# Patient Record
Sex: FEMALE | Race: WHITE | NOT HISPANIC OR LATINO
[De-identification: names, ages, dates, MRNs, and addresses within clinical notes are randomized per-mention and may not be internally consistent; named-entity substitution may affect disease eponyms.]

---

## 2018-12-27 ENCOUNTER — APPOINTMENT (OUTPATIENT)
Dept: FAMILY MEDICINE | Facility: CLINIC | Age: 68
End: 2018-12-27
Payer: MEDICARE

## 2018-12-27 ENCOUNTER — TRANSCRIPTION ENCOUNTER (OUTPATIENT)
Age: 68
End: 2018-12-27

## 2018-12-27 VITALS
TEMPERATURE: 97.2 F | HEART RATE: 82 BPM | HEIGHT: 61 IN | BODY MASS INDEX: 41.16 KG/M2 | SYSTOLIC BLOOD PRESSURE: 118 MMHG | OXYGEN SATURATION: 98 % | RESPIRATION RATE: 16 BRPM | WEIGHT: 218 LBS | DIASTOLIC BLOOD PRESSURE: 72 MMHG

## 2018-12-27 DIAGNOSIS — Z83.3 FAMILY HISTORY OF DIABETES MELLITUS: ICD-10-CM

## 2018-12-27 DIAGNOSIS — H93.19 TINNITUS, UNSPECIFIED EAR: ICD-10-CM

## 2018-12-27 DIAGNOSIS — Z80.1 FAMILY HISTORY OF MALIGNANT NEOPLASM OF TRACHEA, BRONCHUS AND LUNG: ICD-10-CM

## 2018-12-27 DIAGNOSIS — Z82.0 FAMILY HISTORY OF EPILEPSY AND OTHER DISEASES OF THE NERVOUS SYSTEM: ICD-10-CM

## 2018-12-27 DIAGNOSIS — Z80.8 FAMILY HISTORY OF MALIGNANT NEOPLASM OF OTHER ORGANS OR SYSTEMS: ICD-10-CM

## 2018-12-27 DIAGNOSIS — Z82.49 FAMILY HISTORY OF ISCHEMIC HEART DISEASE AND OTHER DISEASES OF THE CIRCULATORY SYSTEM: ICD-10-CM

## 2018-12-27 DIAGNOSIS — Z23 ENCOUNTER FOR IMMUNIZATION: ICD-10-CM

## 2018-12-27 PROCEDURE — 90472 IMMUNIZATION ADMIN EACH ADD: CPT

## 2018-12-27 PROCEDURE — 90715 TDAP VACCINE 7 YRS/> IM: CPT

## 2018-12-27 PROCEDURE — 90732 PPSV23 VACC 2 YRS+ SUBQ/IM: CPT

## 2018-12-27 PROCEDURE — 90471 IMMUNIZATION ADMIN: CPT

## 2018-12-27 PROCEDURE — 99204 OFFICE O/P NEW MOD 45 MIN: CPT | Mod: 25

## 2018-12-27 PROCEDURE — 36415 COLL VENOUS BLD VENIPUNCTURE: CPT

## 2018-12-27 PROCEDURE — G0009: CPT | Mod: RT

## 2018-12-27 NOTE — HISTORY OF PRESENT ILLNESS
[FreeTextEntry8] : here to establish care\par LOVING seen by cardiologist early this year, [facility].\par GERD using Omeprazole with good symptom relief.\par

## 2018-12-27 NOTE — ASSESSMENT
[FreeTextEntry1] : Annual\par Tinnitus: seen by ENT Dr Marvin, advised white noise machine.\par HTN: continue current medication\par Weight loss advised\par GERD\par Hyperlipidemia: check labs

## 2018-12-27 NOTE — COUNSELING
[Weight management counseling provided] : Weight management [Healthy eating counseling provided] : healthy eating [Activity counseling provided] : activity [Decrease Portions] : Decrease food portions [___ min/wk activity recommended] : [unfilled] min/wk activity recommended [Walking] : Walking

## 2018-12-27 NOTE — REVIEW OF SYSTEMS
[Dyspnea on Exertion] : dyspnea on exertion [Heartburn] : heartburn [Joint Pain] : joint pain [Negative] : Heme/Lymph

## 2019-01-03 LAB
ALBUMIN SERPL ELPH-MCNC: 4.1 G/DL
ALP BLD-CCNC: 80 U/L
ALT SERPL-CCNC: 20 U/L
ANION GAP SERPL CALC-SCNC: 11 MMOL/L
AST SERPL-CCNC: 27 U/L
BASOPHILS # BLD AUTO: 0.03 K/UL
BASOPHILS NFR BLD AUTO: 0.4 %
BILIRUB SERPL-MCNC: 0.4 MG/DL
BUN SERPL-MCNC: 14 MG/DL
CALCIUM SERPL-MCNC: 9.2 MG/DL
CHLORIDE SERPL-SCNC: 105 MMOL/L
CHOLEST SERPL-MCNC: 144 MG/DL
CHOLEST/HDLC SERPL: 3.2 RATIO
CO2 SERPL-SCNC: 27 MMOL/L
CREAT SERPL-MCNC: 0.85 MG/DL
EOSINOPHIL # BLD AUTO: 0.14 K/UL
EOSINOPHIL NFR BLD AUTO: 1.8 %
GLUCOSE SERPL-MCNC: 101 MG/DL
HBA1C MFR BLD HPLC: 5.9 %
HCT VFR BLD CALC: 42.4 %
HDLC SERPL-MCNC: 45 MG/DL
HGB BLD-MCNC: 13.2 G/DL
IMM GRANULOCYTES NFR BLD AUTO: 0.1 %
LDLC SERPL CALC-MCNC: 71 MG/DL
LYMPHOCYTES # BLD AUTO: 2.32 K/UL
LYMPHOCYTES NFR BLD AUTO: 30.1 %
MAN DIFF?: NORMAL
MCHC RBC-ENTMCNC: 26.7 PG
MCHC RBC-ENTMCNC: 31.1 GM/DL
MCV RBC AUTO: 85.7 FL
MONOCYTES # BLD AUTO: 0.22 K/UL
MONOCYTES NFR BLD AUTO: 2.9 %
NEUTROPHILS # BLD AUTO: 4.99 K/UL
NEUTROPHILS NFR BLD AUTO: 64.7 %
PLATELET # BLD AUTO: 219 K/UL
POTASSIUM SERPL-SCNC: 3.9 MMOL/L
PROT SERPL-MCNC: 7 G/DL
RBC # BLD: 4.95 M/UL
RBC # FLD: 15.2 %
SODIUM SERPL-SCNC: 143 MMOL/L
TRIGL SERPL-MCNC: 140 MG/DL
WBC # FLD AUTO: 7.71 K/UL

## 2019-03-04 ENCOUNTER — TRANSCRIPTION ENCOUNTER (OUTPATIENT)
Age: 69
End: 2019-03-04

## 2019-04-11 ENCOUNTER — APPOINTMENT (OUTPATIENT)
Dept: FAMILY MEDICINE | Facility: CLINIC | Age: 69
End: 2019-04-11
Payer: MEDICARE

## 2019-04-11 VITALS
HEART RATE: 80 BPM | WEIGHT: 211 LBS | OXYGEN SATURATION: 97 % | RESPIRATION RATE: 16 BRPM | BODY MASS INDEX: 39.84 KG/M2 | TEMPERATURE: 98.9 F | SYSTOLIC BLOOD PRESSURE: 116 MMHG | HEIGHT: 61 IN | DIASTOLIC BLOOD PRESSURE: 62 MMHG

## 2019-04-11 DIAGNOSIS — Z87.09 PERSONAL HISTORY OF OTHER DISEASES OF THE RESPIRATORY SYSTEM: ICD-10-CM

## 2019-04-11 PROCEDURE — 99213 OFFICE O/P EST LOW 20 MIN: CPT

## 2019-04-11 NOTE — REVIEW OF SYSTEMS
[Chills] : chills [Fever] : fever [Hoarseness] : hoarseness [Nasal Discharge] : nasal discharge [Sore Throat] : sore throat [Cough] : cough [Headache] : headache [Negative] : Integumentary [FreeTextEntry4] : ear fullness, sinus pain

## 2019-04-11 NOTE — PHYSICAL EXAM
[No Acute Distress] : no acute distress [Well Nourished] : well nourished [Well Developed] : well developed [Normal Sclera/Conjunctiva] : normal sclera/conjunctiva [PERRL] : pupils equal round and reactive to light [EOMI] : extraocular movements intact [Normal Oropharynx] : the oropharynx was normal [Normal Outer Ear/Nose] : the outer ears and nose were normal in appearance [Normal TMs] : both tympanic membranes were normal [No Lymphadenopathy] : no lymphadenopathy [Supple] : supple [No Respiratory Distress] : no respiratory distress  [Clear to Auscultation] : lungs were clear to auscultation bilaterally [Normal Rate] : normal rate  [Regular Rhythm] : with a regular rhythm [No Accessory Muscle Use] : no accessory muscle use [Normal S1, S2] : normal S1 and S2 [Soft] : abdomen soft [No Edema] : there was no peripheral edema [Non Tender] : non-tender [Non-distended] : non-distended [Normal Bowel Sounds] : normal bowel sounds [Normal Anterior Cervical Nodes] : no anterior cervical lymphadenopathy [No Spinal Tenderness] : no spinal tenderness [Grossly Normal Strength/Tone] : grossly normal strength/tone [No Rash] : no rash [Normal Gait] : normal gait [No Focal Deficits] : no focal deficits [Normal Affect] : the affect was normal [Normal Insight/Judgement] : insight and judgment were intact [de-identified] : pain with palpation of frontal and maxillary sinuses

## 2019-04-11 NOTE — HISTORY OF PRESENT ILLNESS
[FreeTextEntry8] : Patient is here for an acute visit. \par She has been feeling unwell for the past 10 days. \par She started out with cold symptoms, and fatigue. \par She developed sneezing, coughing and laryngitis.  The cough is mildly productive, whitish color. \par She has sore throat.  Voice is starting to come back.  Headaches, sinus pressure, congestion in her face. \par She had a mild fever the other day, nothing since. \par She has been taking mucinex, robitussin without any relief.

## 2019-04-11 NOTE — ASSESSMENT
[FreeTextEntry1] : Sinusitis\par - failed supportive treatments, symptoms over 1 week\par - start antihistamine \par - start augmentin\par - nasal spray\par - if not feeling better after treatment follow up in office\par - tylenol / advil as needed

## 2019-04-22 ENCOUNTER — APPOINTMENT (OUTPATIENT)
Dept: FAMILY MEDICINE | Facility: CLINIC | Age: 69
End: 2019-04-22

## 2019-07-08 ENCOUNTER — APPOINTMENT (OUTPATIENT)
Dept: FAMILY MEDICINE | Facility: CLINIC | Age: 69
End: 2019-07-08
Payer: MEDICARE

## 2019-07-08 ENCOUNTER — NON-APPOINTMENT (OUTPATIENT)
Age: 69
End: 2019-07-08

## 2019-07-08 VITALS
HEART RATE: 78 BPM | SYSTOLIC BLOOD PRESSURE: 132 MMHG | HEIGHT: 61 IN | OXYGEN SATURATION: 98 % | BODY MASS INDEX: 41.16 KG/M2 | TEMPERATURE: 98.7 F | DIASTOLIC BLOOD PRESSURE: 78 MMHG | WEIGHT: 218 LBS | RESPIRATION RATE: 16 BRPM

## 2019-07-08 DIAGNOSIS — G89.29 PAIN IN LEFT KNEE: ICD-10-CM

## 2019-07-08 DIAGNOSIS — M25.562 PAIN IN LEFT KNEE: ICD-10-CM

## 2019-07-08 PROCEDURE — 93000 ELECTROCARDIOGRAM COMPLETE: CPT

## 2019-07-08 PROCEDURE — 36415 COLL VENOUS BLD VENIPUNCTURE: CPT

## 2019-07-08 PROCEDURE — G0447 BEHAVIOR COUNSEL OBESITY 15M: CPT | Mod: 59

## 2019-07-08 PROCEDURE — G0444 DEPRESSION SCREEN ANNUAL: CPT | Mod: 59

## 2019-07-08 PROCEDURE — G0442 ANNUAL ALCOHOL SCREEN 15 MIN: CPT | Mod: 59

## 2019-07-08 PROCEDURE — G0439: CPT

## 2019-07-08 RX ORDER — AMOXICILLIN AND CLAVULANATE POTASSIUM 875; 125 MG/1; MG/1
875-125 TABLET, COATED ORAL
Qty: 14 | Refills: 0 | Status: DISCONTINUED | COMMUNITY
Start: 2019-04-11 | End: 2019-07-08

## 2019-07-08 NOTE — HISTORY OF PRESENT ILLNESS
[FreeTextEntry1] : annual [de-identified] : Ms. ROBERTO MONTERO is a 68 year old female presenting for an annual\par

## 2019-07-08 NOTE — REVIEW OF SYSTEMS
[Joint Pain] : joint pain [Negative] : Heme/Lymph [Dyspnea on Exertion] : no dyspnea on exertion [Heartburn] : no heartburn

## 2019-07-08 NOTE — COUNSELING
[Healthy eating counseling provided] : healthy eating [Activity counseling provided] : activity [Weight Self Once Weekly] : Weight self once weekly [Patient Non-adherent to care plan] : Patient non-adherent to care plan [___ min/wk activity recommended] : [unfilled] min/wk activity recommended [ - Annual Alcohol Misuse Screening] : Annual Alcohol Misuse Screening [ - Annual Depression Screening] : Annual Depression Screening [Good understanding] : Patient has a good understanding of disease, goals and obesity follow-up plan

## 2019-07-08 NOTE — ASSESSMENT
[FreeTextEntry1] : Annual\par Obesity: discussed diet and exercise. Advised to consider seeing a nutritionist.\par HTN: stable. Weight loss advised. \par OA: knee/ Chronic back pain\par PT, Rx given/ Tylenol. weight loss

## 2019-07-08 NOTE — HEALTH RISK ASSESSMENT
[Excellent] : ~his/her~  mood as  excellent [Yes] : Yes [Monthly or less (1 pt)] : Monthly or less (1 point) [1 or 2 (0 pts)] : 1 or 2 (0 points) [Never (0 pts)] : Never (0 points) [No] : In the past 12 months have you used drugs other than those required for medical reasons? No [No falls in past year] : Patient reported no falls in the past year [0] : 2) Feeling down, depressed, or hopeless: Not at all (0) [HIV Test offered] : HIV Test offered [Patient reported mammogram was normal] : Patient reported mammogram was normal [Hepatitis C test offered] : Hepatitis C test offered [Behavioral] : behavioral [Alone] : lives alone [] :  [Retired] : retired [Feels Safe at Home] : Feels safe at home [Fully functional (bathing, dressing, toileting, transferring, walking, feeding)] : Fully functional (bathing, dressing, toileting, transferring, walking, feeding) [Fully functional (using the telephone, shopping, preparing meals, housekeeping, doing laundry, using] : Fully functional and needs no help or supervision to perform IADLs (using the telephone, shopping, preparing meals, housekeeping, doing laundry, using transportation, managing medications and managing finances) [Smoke Detector] : smoke detector [Sunscreen] : uses sunscreen [Carbon Monoxide Detector] : carbon monoxide detector [Seat Belt] :  uses seat belt [Name: ___] : Health Care Proxy's Name: [unfilled]  [Designated Healthcare Proxy] : Designated healthcare proxy [Relationship: ___] : Relationship: [unfilled] [I will adhere to the patient's wishes as expressed in the advance directive except as noted below.] : I will adhere to the patient's wishes as expressed in the advance directive except as noted below [DNR] : DNR [Patient reported colonoscopy was normal] : Patient reported colonoscopy was normal [] : No [YearQuit] : 1972 [Audit-CScore] : 1 [Change in mental status noted] : No change in mental status noted [Language] : denies difficulty with language [Behavior] : denies difficulty with behavior [Learning/Retaining New Information] : denies difficulty learning/retaining new information [Handling Complex Tasks] : denies difficulty handling complex tasks [Reasoning] : denies difficulty with reasoning [Spatial Ability and Orientation] : denies difficulty with spatial ability and orientation [Sexually Active] : not sexually active [Reports changes in hearing] : Reports no changes in hearing [Reports changes in vision] : Reports no changes in vision [TB Exposure] : is not being exposed to tuberculosis [MammogramDate] : 1/2018 [PapSmearComments] : not a candidate for screening [ColonoscopyDate] : 1/2014 [AdvancecareDate] : 7/8/2019

## 2019-07-08 NOTE — PHYSICAL EXAM
[Well Nourished] : well nourished [No Acute Distress] : no acute distress [Well Developed] : well developed [Well-Appearing] : well-appearing [EOMI] : extraocular movements intact [Normal Sclera/Conjunctiva] : normal sclera/conjunctiva [PERRL] : pupils equal round and reactive to light [Normal Oropharynx] : the oropharynx was normal [Normal Outer Ear/Nose] : the outer ears and nose were normal in appearance [No JVD] : no jugular venous distention [Supple] : supple [No Lymphadenopathy] : no lymphadenopathy [No Accessory Muscle Use] : no accessory muscle use [No Respiratory Distress] : no respiratory distress  [Clear to Auscultation] : lungs were clear to auscultation bilaterally [Normal Rate] : normal rate  [Regular Rhythm] : with a regular rhythm [No Murmur] : no murmur heard [Normal S1, S2] : normal S1 and S2 [No Abdominal Bruit] : a ~M bruit was not heard ~T in the abdomen [No Carotid Bruits] : no carotid bruits [No Varicosities] : no varicosities [Pedal Pulses Present] : the pedal pulses are present [No Edema] : there was no peripheral edema [Soft] : abdomen soft [No Extremity Clubbing/Cyanosis] : no extremity clubbing/cyanosis [No Palpable Aorta] : no palpable aorta [Non-distended] : non-distended [Non Tender] : non-tender [No Masses] : no abdominal mass palpated [Normal Bowel Sounds] : normal bowel sounds [Normal Posterior Cervical Nodes] : no posterior cervical lymphadenopathy [No HSM] : no HSM [Normal Anterior Cervical Nodes] : no anterior cervical lymphadenopathy [No CVA Tenderness] : no CVA  tenderness [No Spinal Tenderness] : no spinal tenderness [No Joint Swelling] : no joint swelling [Grossly Normal Strength/Tone] : grossly normal strength/tone [No Rash] : no rash [Coordination Grossly Intact] : coordination grossly intact [Normal Gait] : normal gait [No Focal Deficits] : no focal deficits [Normal Affect] : the affect was normal [Deep Tendon Reflexes (DTR)] : deep tendon reflexes were 2+ and symmetric [Normal Insight/Judgement] : insight and judgment were intact

## 2019-07-11 ENCOUNTER — APPOINTMENT (OUTPATIENT)
Dept: DERMATOLOGY | Facility: CLINIC | Age: 69
End: 2019-07-11
Payer: MEDICARE

## 2019-07-11 DIAGNOSIS — Z41.1 ENCOUNTER FOR COSMETIC SURGERY: ICD-10-CM

## 2019-07-11 DIAGNOSIS — L91.8 OTHER HYPERTROPHIC DISORDERS OF THE SKIN: ICD-10-CM

## 2019-07-11 LAB
25(OH)D3 SERPL-MCNC: 19.8 NG/ML
ALBUMIN SERPL ELPH-MCNC: 4.5 G/DL
ALP BLD-CCNC: 79 U/L
ALT SERPL-CCNC: 10 U/L
ANION GAP SERPL CALC-SCNC: 11 MMOL/L
APPEARANCE: CLEAR
AST SERPL-CCNC: 16 U/L
BASOPHILS # BLD AUTO: 0.07 K/UL
BASOPHILS NFR BLD AUTO: 0.8 %
BILIRUB SERPL-MCNC: 0.3 MG/DL
BILIRUBIN URINE: NEGATIVE
BLOOD URINE: NEGATIVE
BUN SERPL-MCNC: 15 MG/DL
CALCIUM SERPL-MCNC: 9.6 MG/DL
CHLORIDE SERPL-SCNC: 107 MMOL/L
CHOLEST SERPL-MCNC: 129 MG/DL
CHOLEST/HDLC SERPL: 2.8 RATIO
CO2 SERPL-SCNC: 26 MMOL/L
COLOR: YELLOW
CREAT SERPL-MCNC: 0.87 MG/DL
EOSINOPHIL # BLD AUTO: 0.15 K/UL
EOSINOPHIL NFR BLD AUTO: 1.7 %
ESTIMATED AVERAGE GLUCOSE: 126 MG/DL
GLUCOSE QUALITATIVE U: NEGATIVE
GLUCOSE SERPL-MCNC: 100 MG/DL
HBA1C MFR BLD HPLC: 6 %
HCT VFR BLD CALC: 41.5 %
HCV AB SER QL: NONREACTIVE
HCV S/CO RATIO: 0.23 S/CO
HDLC SERPL-MCNC: 46 MG/DL
HGB BLD-MCNC: 12.8 G/DL
HIV1+2 AB SPEC QL IA.RAPID: NONREACTIVE
IMM GRANULOCYTES NFR BLD AUTO: 0.3 %
KETONES URINE: NEGATIVE
LDLC SERPL CALC-MCNC: 62 MG/DL
LEUKOCYTE ESTERASE URINE: NEGATIVE
LYMPHOCYTES # BLD AUTO: 2.48 K/UL
LYMPHOCYTES NFR BLD AUTO: 27.7 %
MAN DIFF?: NORMAL
MCHC RBC-ENTMCNC: 27.2 PG
MCHC RBC-ENTMCNC: 30.8 GM/DL
MCV RBC AUTO: 88.3 FL
MONOCYTES # BLD AUTO: 0.44 K/UL
MONOCYTES NFR BLD AUTO: 4.9 %
NEUTROPHILS # BLD AUTO: 5.78 K/UL
NEUTROPHILS NFR BLD AUTO: 64.6 %
NITRITE URINE: NEGATIVE
PH URINE: 6
PLATELET # BLD AUTO: 220 K/UL
POTASSIUM SERPL-SCNC: 4.6 MMOL/L
PROT SERPL-MCNC: 6.9 G/DL
PROTEIN URINE: NORMAL
RBC # BLD: 4.7 M/UL
RBC # FLD: 14.5 %
SODIUM SERPL-SCNC: 144 MMOL/L
SPECIFIC GRAVITY URINE: 1.02
T4 SERPL-MCNC: 6.3 UG/DL
TRIGL SERPL-MCNC: 105 MG/DL
TSH SERPL-ACNC: 1.69 UIU/ML
URATE SERPL-MCNC: 5.4 MG/DL
UROBILINOGEN URINE: NORMAL
WBC # FLD AUTO: 8.95 K/UL

## 2019-07-11 PROCEDURE — D0125: CPT

## 2019-07-11 PROCEDURE — 99203 OFFICE O/P NEW LOW 30 MIN: CPT

## 2019-07-11 NOTE — PHYSICAL EXAM
[Alert] : alert [Oriented x 3] : ~L oriented x 3 [Well Nourished] : well nourished [FreeTextEntry3] : Skin colored tags - bilateral thighs and axillae.

## 2019-07-11 NOTE — HISTORY OF PRESENT ILLNESS
[de-identified] : Increasing in number over years.  No bleeding or tenderness.  no self tx. [FreeTextEntry1] : Lesions of the thighs and axilla.

## 2019-07-22 ENCOUNTER — TRANSCRIPTION ENCOUNTER (OUTPATIENT)
Age: 69
End: 2019-07-22

## 2019-07-22 LAB — HEMOCCULT STL QL IA: NEGATIVE

## 2019-10-30 ENCOUNTER — APPOINTMENT (OUTPATIENT)
Dept: FAMILY MEDICINE | Facility: CLINIC | Age: 69
End: 2019-10-30
Payer: MEDICARE

## 2019-10-30 VITALS
HEIGHT: 61 IN | WEIGHT: 218 LBS | DIASTOLIC BLOOD PRESSURE: 80 MMHG | BODY MASS INDEX: 41.16 KG/M2 | SYSTOLIC BLOOD PRESSURE: 130 MMHG | TEMPERATURE: 98.3 F | OXYGEN SATURATION: 96 % | RESPIRATION RATE: 16 BRPM | HEART RATE: 84 BPM

## 2019-10-30 DIAGNOSIS — J06.9 ACUTE UPPER RESPIRATORY INFECTION, UNSPECIFIED: ICD-10-CM

## 2019-10-30 DIAGNOSIS — R68.89 OTHER GENERAL SYMPTOMS AND SIGNS: ICD-10-CM

## 2019-10-30 PROCEDURE — 87804 INFLUENZA ASSAY W/OPTIC: CPT | Mod: QW

## 2019-10-30 PROCEDURE — 99214 OFFICE O/P EST MOD 30 MIN: CPT

## 2019-10-30 NOTE — ASSESSMENT
[FreeTextEntry1] : Acute URi\par Symptomatic treatment\par Medrol \par Call if not improved in 4-5 days.

## 2019-10-30 NOTE — PHYSICAL EXAM
[No Acute Distress] : no acute distress [Well Nourished] : well nourished [Well Developed] : well developed [Well-Appearing] : well-appearing [Normal Sclera/Conjunctiva] : normal sclera/conjunctiva [PERRL] : pupils equal round and reactive to light [EOMI] : extraocular movements intact [Normal Outer Ear/Nose] : the outer ears and nose were normal in appearance [Normal Oropharynx] : the oropharynx was normal [No JVD] : no jugular venous distention [No Lymphadenopathy] : no lymphadenopathy [Supple] : supple [No Respiratory Distress] : no respiratory distress  [No Accessory Muscle Use] : no accessory muscle use [Clear to Auscultation] : lungs were clear to auscultation bilaterally [Normal Rate] : normal rate  [Regular Rhythm] : with a regular rhythm [Normal S1, S2] : normal S1 and S2 [No Murmur] : no murmur heard [No Carotid Bruits] : no carotid bruits [No Abdominal Bruit] : a ~M bruit was not heard ~T in the abdomen [No Varicosities] : no varicosities [Pedal Pulses Present] : the pedal pulses are present [No Edema] : there was no peripheral edema [No Palpable Aorta] : no palpable aorta [No Extremity Clubbing/Cyanosis] : no extremity clubbing/cyanosis [Soft] : abdomen soft [Non Tender] : non-tender [Non-distended] : non-distended [No Masses] : no abdominal mass palpated [No HSM] : no HSM [Normal Bowel Sounds] : normal bowel sounds [Normal Posterior Cervical Nodes] : no posterior cervical lymphadenopathy [Normal Anterior Cervical Nodes] : no anterior cervical lymphadenopathy [No CVA Tenderness] : no CVA  tenderness [No Spinal Tenderness] : no spinal tenderness [No Joint Swelling] : no joint swelling [Grossly Normal Strength/Tone] : grossly normal strength/tone [No Rash] : no rash [Coordination Grossly Intact] : coordination grossly intact [No Focal Deficits] : no focal deficits [Normal Gait] : normal gait [Deep Tendon Reflexes (DTR)] : deep tendon reflexes were 2+ and symmetric [Normal Affect] : the affect was normal [Normal Insight/Judgement] : insight and judgment were intact

## 2019-10-30 NOTE — HISTORY OF PRESENT ILLNESS
[Cold Symptoms] : cold symptoms [Moderate] : moderate [___ Days ago] : [unfilled] days ago [Constant] : constant [Congestion] : congestion [Cough] : cough [Sore Throat] : sore throat [Fatigue] : fatigue [Headache] : headache [NSAIDs] : NSAIDs [OTC Remedies] : OTC remedies [Stable] : stable [Wheezing] : no wheezing [Chills] : no chills [Anorexia] : no anorexia [Shortness Of Breath] : no shortness of breath [Earache] : no earache [Fever] : no fever [FreeTextEntry5] : mucinex and Robutussin

## 2019-10-30 NOTE — REVIEW OF SYSTEMS
[Chills] : chills [Fatigue] : fatigue [Nasal Discharge] : nasal discharge [Sore Throat] : sore throat [Cough] : cough [Diarrhea] : diarrhea [Negative] : Heme/Lymph [Fever] : no fever [Shortness Of Breath] : no shortness of breath [Wheezing] : no wheezing [Dyspnea on Exertion] : no dyspnea on exertion [Heartburn] : no heartburn [Joint Pain] : no joint pain

## 2019-11-03 LAB
FLUAV SPEC QL CULT: NEGATIVE
FLUBV AG SPEC QL IA: NEGATIVE

## 2019-11-04 ENCOUNTER — MEDICATION RENEWAL (OUTPATIENT)
Age: 69
End: 2019-11-04

## 2020-02-10 ENCOUNTER — APPOINTMENT (OUTPATIENT)
Dept: FAMILY MEDICINE | Facility: CLINIC | Age: 70
End: 2020-02-10
Payer: MEDICARE

## 2020-02-10 VITALS
RESPIRATION RATE: 15 BRPM | HEIGHT: 61 IN | HEART RATE: 75 BPM | WEIGHT: 205 LBS | BODY MASS INDEX: 38.71 KG/M2 | SYSTOLIC BLOOD PRESSURE: 124 MMHG | OXYGEN SATURATION: 98 % | DIASTOLIC BLOOD PRESSURE: 78 MMHG

## 2020-02-10 DIAGNOSIS — H91.90 UNSPECIFIED HEARING LOSS, UNSPECIFIED EAR: ICD-10-CM

## 2020-02-10 DIAGNOSIS — M54.9 DORSALGIA, UNSPECIFIED: ICD-10-CM

## 2020-02-10 DIAGNOSIS — G89.29 DORSALGIA, UNSPECIFIED: ICD-10-CM

## 2020-02-10 PROCEDURE — 90662 IIV NO PRSV INCREASED AG IM: CPT

## 2020-02-10 PROCEDURE — G0008: CPT

## 2020-02-10 PROCEDURE — 36415 COLL VENOUS BLD VENIPUNCTURE: CPT

## 2020-02-10 PROCEDURE — 99214 OFFICE O/P EST MOD 30 MIN: CPT | Mod: 25

## 2020-02-10 RX ORDER — METHYLPREDNISOLONE 4 MG/1
4 TABLET ORAL
Qty: 1 | Refills: 0 | Status: DISCONTINUED | COMMUNITY
Start: 2019-10-30 | End: 2020-02-10

## 2020-02-10 RX ORDER — AZITHROMYCIN 250 MG/1
250 TABLET, FILM COATED ORAL
Qty: 1 | Refills: 0 | Status: DISCONTINUED | COMMUNITY
Start: 2019-11-04 | End: 2020-02-10

## 2020-02-10 NOTE — REVIEW OF SYSTEMS
[Chills] : chills [Fatigue] : fatigue [Nasal Discharge] : nasal discharge [Sore Throat] : sore throat [Cough] : cough [Diarrhea] : diarrhea [Joint Stiffness] : joint stiffness [Back Pain] : back pain [Negative] : Heme/Lymph [Fever] : no fever [Shortness Of Breath] : no shortness of breath [Wheezing] : no wheezing [Dyspnea on Exertion] : no dyspnea on exertion [Heartburn] : no heartburn [Joint Pain] : no joint pain [Joint Swelling] : no joint swelling [Muscle Weakness] : no muscle weakness

## 2020-02-10 NOTE — ASSESSMENT
[FreeTextEntry1] : HTN: BP stable\par renewed Losartan and HCTZ\par Vitamin D def: taking OTC 2000 Iu .\par Obesity: working on lifestyle changes. has lost a few lbs\par Hearing loss: was checked last year. \par Reports did not need hearing aide. recheck, referral given.\par Chronic back pain/ Lumbar radiculopathy: referral to PT\par Weight loss.\par Will consider imaging if not improved.\par GERD: advised to not take omeprazole daily. Discussed diet.\par try Pepcid. Take Omeprazole prn.\par \par

## 2020-02-10 NOTE — PHYSICAL EXAM
[No Acute Distress] : no acute distress [Well Nourished] : well nourished [Well Developed] : well developed [Well-Appearing] : well-appearing [Normal Sclera/Conjunctiva] : normal sclera/conjunctiva [EOMI] : extraocular movements intact [PERRL] : pupils equal round and reactive to light [Normal Outer Ear/Nose] : the outer ears and nose were normal in appearance [Normal Oropharynx] : the oropharynx was normal [No JVD] : no jugular venous distention [No Lymphadenopathy] : no lymphadenopathy [Supple] : supple [No Respiratory Distress] : no respiratory distress  [No Accessory Muscle Use] : no accessory muscle use [Clear to Auscultation] : lungs were clear to auscultation bilaterally [Normal Rate] : normal rate  [Regular Rhythm] : with a regular rhythm [Normal S1, S2] : normal S1 and S2 [No Murmur] : no murmur heard [No Carotid Bruits] : no carotid bruits [No Abdominal Bruit] : a ~M bruit was not heard ~T in the abdomen [No Varicosities] : no varicosities [Pedal Pulses Present] : the pedal pulses are present [No Edema] : there was no peripheral edema [No Palpable Aorta] : no palpable aorta [Soft] : abdomen soft [No Extremity Clubbing/Cyanosis] : no extremity clubbing/cyanosis [Non Tender] : non-tender [Non-distended] : non-distended [No Masses] : no abdominal mass palpated [No HSM] : no HSM [Normal Bowel Sounds] : normal bowel sounds [Normal Posterior Cervical Nodes] : no posterior cervical lymphadenopathy [Normal Anterior Cervical Nodes] : no anterior cervical lymphadenopathy [No CVA Tenderness] : no CVA  tenderness [No Spinal Tenderness] : no spinal tenderness [No Joint Swelling] : no joint swelling [No Rash] : no rash [Grossly Normal Strength/Tone] : grossly normal strength/tone [Coordination Grossly Intact] : coordination grossly intact [No Focal Deficits] : no focal deficits [Normal Gait] : normal gait [Deep Tendon Reflexes (DTR)] : deep tendon reflexes were 2+ and symmetric [Normal Insight/Judgement] : insight and judgment were intact [Normal Affect] : the affect was normal

## 2020-02-10 NOTE — HISTORY OF PRESENT ILLNESS
[FreeTextEntry1] : follow up [de-identified] : multiple joint complaints\par Upper and lower back for many years. Joined gym. Trying to lose weight.\par Some numbness in lower extremity after sitting for a while.

## 2020-02-12 ENCOUNTER — FORM ENCOUNTER (OUTPATIENT)
Age: 70
End: 2020-02-12

## 2020-02-12 ENCOUNTER — TRANSCRIPTION ENCOUNTER (OUTPATIENT)
Age: 70
End: 2020-02-12

## 2020-02-12 LAB
ALBUMIN SERPL ELPH-MCNC: 4.7 G/DL
ALP BLD-CCNC: 77 U/L
ALT SERPL-CCNC: 13 U/L
ANION GAP SERPL CALC-SCNC: 8 MMOL/L
APPEARANCE: CLEAR
AST SERPL-CCNC: 16 U/L
BASOPHILS # BLD AUTO: 0.06 K/UL
BASOPHILS NFR BLD AUTO: 0.7 %
BILIRUB SERPL-MCNC: 0.3 MG/DL
BILIRUBIN URINE: NEGATIVE
BLOOD URINE: NEGATIVE
BUN SERPL-MCNC: 15 MG/DL
CALCIUM SERPL-MCNC: 9.6 MG/DL
CHLORIDE SERPL-SCNC: 106 MMOL/L
CHOLEST SERPL-MCNC: 140 MG/DL
CHOLEST/HDLC SERPL: 2.7 RATIO
CO2 SERPL-SCNC: 28 MMOL/L
COLOR: YELLOW
CREAT SERPL-MCNC: 0.89 MG/DL
EOSINOPHIL # BLD AUTO: 0.2 K/UL
EOSINOPHIL NFR BLD AUTO: 2.2 %
ESTIMATED AVERAGE GLUCOSE: 123 MG/DL
GLUCOSE QUALITATIVE U: NEGATIVE
GLUCOSE SERPL-MCNC: 92 MG/DL
HBA1C MFR BLD HPLC: 5.9 %
HCT VFR BLD CALC: 42.9 %
HDLC SERPL-MCNC: 52 MG/DL
HGB BLD-MCNC: 13.1 G/DL
IMM GRANULOCYTES NFR BLD AUTO: 0.3 %
KETONES URINE: NEGATIVE
LDLC SERPL CALC-MCNC: 71 MG/DL
LEUKOCYTE ESTERASE URINE: NEGATIVE
LYMPHOCYTES # BLD AUTO: 2.9 K/UL
LYMPHOCYTES NFR BLD AUTO: 32.2 %
MAN DIFF?: NORMAL
MCHC RBC-ENTMCNC: 26.5 PG
MCHC RBC-ENTMCNC: 30.5 GM/DL
MCV RBC AUTO: 86.7 FL
MONOCYTES # BLD AUTO: 0.33 K/UL
MONOCYTES NFR BLD AUTO: 3.7 %
NEUTROPHILS # BLD AUTO: 5.49 K/UL
NEUTROPHILS NFR BLD AUTO: 60.9 %
NITRITE URINE: NEGATIVE
PH URINE: 7
PLATELET # BLD AUTO: 224 K/UL
POTASSIUM SERPL-SCNC: 4.8 MMOL/L
PROT SERPL-MCNC: 7 G/DL
PROTEIN URINE: NORMAL
RBC # BLD: 4.95 M/UL
RBC # FLD: 14.4 %
SODIUM SERPL-SCNC: 142 MMOL/L
SPECIFIC GRAVITY URINE: 1.03
T4 SERPL-MCNC: 7.1 UG/DL
TRIGL SERPL-MCNC: 88 MG/DL
TSH SERPL-ACNC: 1.57 UIU/ML
URATE SERPL-MCNC: 4.7 MG/DL
UROBILINOGEN URINE: NORMAL
WBC # FLD AUTO: 9.01 K/UL

## 2020-02-13 ENCOUNTER — APPOINTMENT (OUTPATIENT)
Dept: MAMMOGRAPHY | Facility: CLINIC | Age: 70
End: 2020-02-13
Payer: MEDICARE

## 2020-02-13 ENCOUNTER — APPOINTMENT (OUTPATIENT)
Dept: RADIOLOGY | Facility: CLINIC | Age: 70
End: 2020-02-13
Payer: MEDICARE

## 2020-02-13 ENCOUNTER — OUTPATIENT (OUTPATIENT)
Dept: OUTPATIENT SERVICES | Facility: HOSPITAL | Age: 70
LOS: 1 days | End: 2020-02-13
Payer: COMMERCIAL

## 2020-02-13 DIAGNOSIS — I10 ESSENTIAL (PRIMARY) HYPERTENSION: ICD-10-CM

## 2020-02-13 DIAGNOSIS — E66.9 OBESITY, UNSPECIFIED: ICD-10-CM

## 2020-02-13 DIAGNOSIS — E78.5 HYPERLIPIDEMIA, UNSPECIFIED: ICD-10-CM

## 2020-02-13 PROCEDURE — 77063 BREAST TOMOSYNTHESIS BI: CPT | Mod: 26

## 2020-02-13 PROCEDURE — 77067 SCR MAMMO BI INCL CAD: CPT | Mod: 26

## 2020-02-13 PROCEDURE — 77080 DXA BONE DENSITY AXIAL: CPT | Mod: 26

## 2020-02-13 PROCEDURE — 77063 BREAST TOMOSYNTHESIS BI: CPT

## 2020-02-13 PROCEDURE — 77080 DXA BONE DENSITY AXIAL: CPT

## 2020-02-13 PROCEDURE — 77067 SCR MAMMO BI INCL CAD: CPT

## 2020-02-16 ENCOUNTER — TRANSCRIPTION ENCOUNTER (OUTPATIENT)
Age: 70
End: 2020-02-16

## 2020-08-25 ENCOUNTER — RX RENEWAL (OUTPATIENT)
Age: 70
End: 2020-08-25

## 2020-08-27 ENCOUNTER — APPOINTMENT (OUTPATIENT)
Dept: FAMILY MEDICINE | Facility: CLINIC | Age: 70
End: 2020-08-27

## 2020-10-08 DIAGNOSIS — R42 DIZZINESS AND GIDDINESS: ICD-10-CM

## 2020-10-14 ENCOUNTER — APPOINTMENT (OUTPATIENT)
Dept: FAMILY MEDICINE | Facility: CLINIC | Age: 70
End: 2020-10-14
Payer: MEDICARE

## 2020-10-14 ENCOUNTER — APPOINTMENT (OUTPATIENT)
Dept: FAMILY MEDICINE | Facility: CLINIC | Age: 70
End: 2020-10-14

## 2020-10-14 ENCOUNTER — NON-APPOINTMENT (OUTPATIENT)
Age: 70
End: 2020-10-14

## 2020-10-14 VITALS
SYSTOLIC BLOOD PRESSURE: 124 MMHG | RESPIRATION RATE: 16 BRPM | HEIGHT: 61 IN | DIASTOLIC BLOOD PRESSURE: 80 MMHG | HEART RATE: 81 BPM | TEMPERATURE: 98.7 F | OXYGEN SATURATION: 98 % | WEIGHT: 200 LBS | BODY MASS INDEX: 37.76 KG/M2

## 2020-10-14 VITALS — TEMPERATURE: 97.6 F

## 2020-10-14 PROCEDURE — G0447 BEHAVIOR COUNSEL OBESITY 15M: CPT | Mod: 59

## 2020-10-14 PROCEDURE — 93000 ELECTROCARDIOGRAM COMPLETE: CPT | Mod: 59

## 2020-10-14 PROCEDURE — 90471 IMMUNIZATION ADMIN: CPT

## 2020-10-14 PROCEDURE — 36415 COLL VENOUS BLD VENIPUNCTURE: CPT

## 2020-10-14 PROCEDURE — G0439: CPT

## 2020-10-14 PROCEDURE — 90750 HZV VACC RECOMBINANT IM: CPT

## 2020-10-14 PROCEDURE — G0442 ANNUAL ALCOHOL SCREEN 15 MIN: CPT | Mod: 59

## 2020-10-14 RX ORDER — OMEPRAZOLE 40 MG/1
40 CAPSULE, DELAYED RELEASE ORAL
Qty: 90 | Refills: 1 | Status: DISCONTINUED | COMMUNITY
End: 2020-10-14

## 2020-10-14 NOTE — ASSESSMENT
[FreeTextEntry1] : Annual\par Mammogram 2/20202\par DEXA 2020 Osteopenia taking Vit D \par Not exercising.\par Obesity: discussed diet and exercise. Advised to consider seeing a nutritionist.\par HTN: stable. Weight loss advised. \par OA: knee/ Chronic back pain\par Tylenol\par HTN on Losartan and HCTZ\par Has acid reflux reports OTC prilosec does not help\par Prevacid helps, not covered.\par Rx sent\par HLD on Simvastatin\par Has received flu vaccine\par Pneumovax 12/18

## 2020-10-14 NOTE — HEALTH RISK ASSESSMENT
[Good] : ~his/her~  mood as  good [Yes] : Yes [1 or 2 (0 pts)] : 1 or 2 (0 points) [Monthly or less (1 pt)] : Monthly or less (1 point) [Never (0 pts)] : Never (0 points) [No falls in past year] : Patient reported no falls in the past year [No] : In the past 12 months have you used drugs other than those required for medical reasons? No [0] : 1) Little interest or pleasure doing things: Not at all (0) [Patient reported mammogram was normal] : Patient reported mammogram was normal [Patient reported colonoscopy was normal] : Patient reported colonoscopy was normal [Behavioral] : behavioral [Retired] : retired [Alone] : lives alone [] :  [Feels Safe at Home] : Feels safe at home [Smoke Detector] : smoke detector [Carbon Monoxide Detector] : carbon monoxide detector [Seat Belt] :  uses seat belt [Sunscreen] : uses sunscreen [Reviewed no changes] : Reviewed no changes [Name: ___] : Health Care Proxy's Name: [unfilled]  [Designated Healthcare Proxy] : Designated healthcare proxy [Relationship: ___] : Relationship: [unfilled] [I will adhere to the patient's wishes as expressed in the advance directive except as noted below.] : I will adhere to the patient's wishes as expressed in the advance directive except as noted below [DNR] : DNR [YearQuit] : 1972 [] : No [Audit-CScore] : 1 [[id number]] : 0 [Change in mental status noted] : No change in mental status noted [Language] : denies difficulty with language [Behavior] : denies difficulty with behavior [Learning/Retaining New Information] : denies difficulty learning/retaining new information [Reasoning] : denies difficulty with reasoning [Handling Complex Tasks] : denies difficulty handling complex tasks [Spatial Ability and Orientation] : denies difficulty with spatial ability and orientation [Sexually Active] : not sexually active [Fully functional (bathing, dressing, toileting, transferring, walking, feeding)] : Fully functional (bathing, dressing, toileting, transferring, walking, feeding) [Fully functional (using the telephone, shopping, preparing meals, housekeeping, doing laundry, using] : Fully functional and needs no help or supervision to perform IADLs (using the telephone, shopping, preparing meals, housekeeping, doing laundry, using transportation, managing medications and managing finances) [Reports changes in hearing] : Reports no changes in hearing [Reports changes in vision] : Reports no changes in vision [TB Exposure] : is not being exposed to tuberculosis [MammogramDate] : 2/2020 [PapSmearComments] : not a candidate for screening [ColonoscopyDate] : 1/2014 [HIVDate] : 2/20 [HepatitisCDate] : 7/19 [AdvancecareDate] : 10/2020

## 2020-10-14 NOTE — PHYSICAL EXAM
[No Acute Distress] : no acute distress [Well Nourished] : well nourished [Well Developed] : well developed [Well-Appearing] : well-appearing [PERRL] : pupils equal round and reactive to light [EOMI] : extraocular movements intact [Normal Sclera/Conjunctiva] : normal sclera/conjunctiva [Normal Outer Ear/Nose] : the outer ears and nose were normal in appearance [Normal Oropharynx] : the oropharynx was normal [No JVD] : no jugular venous distention [No Lymphadenopathy] : no lymphadenopathy [No Respiratory Distress] : no respiratory distress  [Supple] : supple [No Accessory Muscle Use] : no accessory muscle use [Clear to Auscultation] : lungs were clear to auscultation bilaterally [Regular Rhythm] : with a regular rhythm [Normal Rate] : normal rate  [Normal S1, S2] : normal S1 and S2 [No Murmur] : no murmur heard [No Carotid Bruits] : no carotid bruits [No Abdominal Bruit] : a ~M bruit was not heard ~T in the abdomen [No Varicosities] : no varicosities [No Edema] : there was no peripheral edema [Pedal Pulses Present] : the pedal pulses are present [No Palpable Aorta] : no palpable aorta [No Extremity Clubbing/Cyanosis] : no extremity clubbing/cyanosis [Soft] : abdomen soft [Non Tender] : non-tender [Non-distended] : non-distended [No HSM] : no HSM [No Masses] : no abdominal mass palpated [Normal Bowel Sounds] : normal bowel sounds [Normal Posterior Cervical Nodes] : no posterior cervical lymphadenopathy [Normal Anterior Cervical Nodes] : no anterior cervical lymphadenopathy [No CVA Tenderness] : no CVA  tenderness [Grossly Normal Strength/Tone] : grossly normal strength/tone [No Joint Swelling] : no joint swelling [No Spinal Tenderness] : no spinal tenderness [No Rash] : no rash [Coordination Grossly Intact] : coordination grossly intact [No Focal Deficits] : no focal deficits [Normal Gait] : normal gait [Deep Tendon Reflexes (DTR)] : deep tendon reflexes were 2+ and symmetric [Normal Affect] : the affect was normal [Normal Insight/Judgement] : insight and judgment were intact

## 2020-10-14 NOTE — HISTORY OF PRESENT ILLNESS
[FreeTextEntry1] : annual [de-identified] : Ms. ROBERTO MONTERO is a 68 year old female presenting for an annual\par Has vertigo on and off\par Using a roll on OTC which helps\par Viral syndrome 2 weeks ago brought on the symptoms.\par HTN on Losartan and HCTZ\par Has acid reflux reports OTC prilosec does not help\par Prevacid helps, not covered.\par HLD on Simvastatin

## 2020-10-19 ENCOUNTER — TRANSCRIPTION ENCOUNTER (OUTPATIENT)
Age: 70
End: 2020-10-19

## 2020-10-19 LAB
25(OH)D3 SERPL-MCNC: 19.6 NG/ML
ALBUMIN SERPL ELPH-MCNC: 4.8 G/DL
ALP BLD-CCNC: 82 U/L
ALT SERPL-CCNC: 10 U/L
ANION GAP SERPL CALC-SCNC: 12 MMOL/L
AST SERPL-CCNC: 16 U/L
BILIRUB SERPL-MCNC: 0.7 MG/DL
BUN SERPL-MCNC: 12 MG/DL
CALCIUM SERPL-MCNC: 9.7 MG/DL
CHLORIDE SERPL-SCNC: 103 MMOL/L
CHOLEST SERPL-MCNC: 122 MG/DL
CHOLEST/HDLC SERPL: 2.6 RATIO
CO2 SERPL-SCNC: 26 MMOL/L
CREAT SERPL-MCNC: 0.85 MG/DL
ESTIMATED AVERAGE GLUCOSE: 114 MG/DL
GLUCOSE SERPL-MCNC: 86 MG/DL
HBA1C MFR BLD HPLC: 5.6 %
HDLC SERPL-MCNC: 47 MG/DL
LDLC SERPL CALC-MCNC: 57 MG/DL
POTASSIUM SERPL-SCNC: 3.7 MMOL/L
PROT SERPL-MCNC: 7 G/DL
SODIUM SERPL-SCNC: 142 MMOL/L
TRIGL SERPL-MCNC: 95 MG/DL
TSH SERPL-ACNC: 1.72 UIU/ML

## 2020-10-27 RX ORDER — AZITHROMYCIN 500 MG/1
0 TABLET, FILM COATED ORAL
Qty: 0 | Refills: 0 | DISCHARGE
Start: 2020-10-27 | End: 2020-12-01

## 2020-11-30 ENCOUNTER — INPATIENT (INPATIENT)
Facility: HOSPITAL | Age: 70
LOS: 6 days | Discharge: HOME HEALTH | DRG: 177 | End: 2020-12-07
Attending: INTERNAL MEDICINE | Admitting: INTERNAL MEDICINE
Payer: MEDICARE

## 2020-11-30 VITALS
OXYGEN SATURATION: 89 % | TEMPERATURE: 98 F | DIASTOLIC BLOOD PRESSURE: 78 MMHG | HEART RATE: 103 BPM | RESPIRATION RATE: 20 BRPM | WEIGHT: 182.98 LBS | SYSTOLIC BLOOD PRESSURE: 153 MMHG

## 2020-11-30 DIAGNOSIS — U07.1 COVID-19: ICD-10-CM

## 2020-11-30 LAB
ALBUMIN SERPL ELPH-MCNC: 3.5 G/DL — SIGNIFICANT CHANGE UP (ref 3.3–5)
ALP SERPL-CCNC: 67 U/L — SIGNIFICANT CHANGE UP (ref 40–120)
ALT FLD-CCNC: 25 U/L — SIGNIFICANT CHANGE UP (ref 12–78)
ANION GAP SERPL CALC-SCNC: 8 MMOL/L — SIGNIFICANT CHANGE UP (ref 5–17)
AST SERPL-CCNC: 45 U/L — HIGH (ref 15–37)
BASE EXCESS BLDV CALC-SCNC: 4.2 MMOL/L — HIGH (ref -2–2)
BASOPHILS # BLD AUTO: 0.01 K/UL — SIGNIFICANT CHANGE UP (ref 0–0.2)
BASOPHILS NFR BLD AUTO: 0.2 % — SIGNIFICANT CHANGE UP (ref 0–2)
BILIRUB SERPL-MCNC: 0.6 MG/DL — SIGNIFICANT CHANGE UP (ref 0.2–1.2)
BUN SERPL-MCNC: 13 MG/DL — SIGNIFICANT CHANGE UP (ref 7–23)
CALCIUM SERPL-MCNC: 8.6 MG/DL — SIGNIFICANT CHANGE UP (ref 8.5–10.1)
CHLORIDE SERPL-SCNC: 104 MMOL/L — SIGNIFICANT CHANGE UP (ref 96–108)
CO2 SERPL-SCNC: 27 MMOL/L — SIGNIFICANT CHANGE UP (ref 22–31)
CREAT SERPL-MCNC: 0.84 MG/DL — SIGNIFICANT CHANGE UP (ref 0.5–1.3)
CRP SERPL-MCNC: 3.12 MG/DL — HIGH (ref 0–0.4)
D DIMER BLD IA.RAPID-MCNC: 453 NG/ML DDU — HIGH
EOSINOPHIL # BLD AUTO: 0.01 K/UL — SIGNIFICANT CHANGE UP (ref 0–0.5)
EOSINOPHIL NFR BLD AUTO: 0.2 % — SIGNIFICANT CHANGE UP (ref 0–6)
FERRITIN SERPL-MCNC: 263 NG/ML — HIGH (ref 15–150)
GLUCOSE SERPL-MCNC: 118 MG/DL — HIGH (ref 70–99)
HCO3 BLDV-SCNC: 27 MMOL/L — SIGNIFICANT CHANGE UP (ref 21–29)
HCT VFR BLD CALC: 39.6 % — SIGNIFICANT CHANGE UP (ref 34.5–45)
HGB BLD-MCNC: 13.2 G/DL — SIGNIFICANT CHANGE UP (ref 11.5–15.5)
IMM GRANULOCYTES NFR BLD AUTO: 0.5 % — SIGNIFICANT CHANGE UP (ref 0–1.5)
LACTATE SERPL-SCNC: 1.7 MMOL/L — SIGNIFICANT CHANGE UP (ref 0.7–2)
LDH SERPL L TO P-CCNC: 281 U/L — HIGH (ref 84–241)
LYMPHOCYTES # BLD AUTO: 0.8 K/UL — LOW (ref 1–3.3)
LYMPHOCYTES # BLD AUTO: 14.1 % — SIGNIFICANT CHANGE UP (ref 13–44)
MAGNESIUM SERPL-MCNC: 2.2 MG/DL — SIGNIFICANT CHANGE UP (ref 1.6–2.6)
MCHC RBC-ENTMCNC: 26.7 PG — LOW (ref 27–34)
MCHC RBC-ENTMCNC: 33.3 GM/DL — SIGNIFICANT CHANGE UP (ref 32–36)
MCV RBC AUTO: 80 FL — SIGNIFICANT CHANGE UP (ref 80–100)
MONOCYTES # BLD AUTO: 0.2 K/UL — SIGNIFICANT CHANGE UP (ref 0–0.9)
MONOCYTES NFR BLD AUTO: 3.5 % — SIGNIFICANT CHANGE UP (ref 2–14)
NEUTROPHILS # BLD AUTO: 4.61 K/UL — SIGNIFICANT CHANGE UP (ref 1.8–7.4)
NEUTROPHILS NFR BLD AUTO: 81.5 % — HIGH (ref 43–77)
NT-PROBNP SERPL-SCNC: 98 PG/ML — SIGNIFICANT CHANGE UP (ref 0–125)
PCO2 BLDV: 37 MMHG — SIGNIFICANT CHANGE UP (ref 35–50)
PH BLDV: 7.48 — HIGH (ref 7.35–7.45)
PHOSPHATE SERPL-MCNC: 2.6 MG/DL — SIGNIFICANT CHANGE UP (ref 2.5–4.5)
PLATELET # BLD AUTO: 136 K/UL — LOW (ref 150–400)
PO2 BLDV: 46 MMHG — HIGH (ref 25–45)
POTASSIUM SERPL-MCNC: 3.1 MMOL/L — LOW (ref 3.5–5.3)
POTASSIUM SERPL-SCNC: 3.1 MMOL/L — LOW (ref 3.5–5.3)
PROCALCITONIN SERPL-MCNC: 0.05 NG/ML — SIGNIFICANT CHANGE UP (ref 0.02–0.1)
PROT SERPL-MCNC: 7.2 GM/DL — SIGNIFICANT CHANGE UP (ref 6–8.3)
RBC # BLD: 4.95 M/UL — SIGNIFICANT CHANGE UP (ref 3.8–5.2)
RBC # FLD: 13.3 % — SIGNIFICANT CHANGE UP (ref 10.3–14.5)
SAO2 % BLDV: 84 % — SIGNIFICANT CHANGE UP (ref 67–88)
SARS-COV-2 RNA SPEC QL NAA+PROBE: DETECTED
SODIUM SERPL-SCNC: 139 MMOL/L — SIGNIFICANT CHANGE UP (ref 135–145)
TROPONIN I SERPL-MCNC: <0.015 NG/ML — SIGNIFICANT CHANGE UP (ref 0.01–0.04)
WBC # BLD: 5.66 K/UL — SIGNIFICANT CHANGE UP (ref 3.8–10.5)
WBC # FLD AUTO: 5.66 K/UL — SIGNIFICANT CHANGE UP (ref 3.8–10.5)

## 2020-11-30 PROCEDURE — 83735 ASSAY OF MAGNESIUM: CPT

## 2020-11-30 PROCEDURE — 99223 1ST HOSP IP/OBS HIGH 75: CPT

## 2020-11-30 PROCEDURE — 93010 ELECTROCARDIOGRAM REPORT: CPT

## 2020-11-30 PROCEDURE — 71045 X-RAY EXAM CHEST 1 VIEW: CPT

## 2020-11-30 PROCEDURE — 86140 C-REACTIVE PROTEIN: CPT

## 2020-11-30 PROCEDURE — 84100 ASSAY OF PHOSPHORUS: CPT

## 2020-11-30 PROCEDURE — 82565 ASSAY OF CREATININE: CPT

## 2020-11-30 PROCEDURE — A9579: CPT

## 2020-11-30 PROCEDURE — 83615 LACTATE (LD) (LDH) ENZYME: CPT

## 2020-11-30 PROCEDURE — 86769 SARS-COV-2 COVID-19 ANTIBODY: CPT

## 2020-11-30 PROCEDURE — 71275 CT ANGIOGRAPHY CHEST: CPT | Mod: 26

## 2020-11-30 PROCEDURE — 84145 PROCALCITONIN (PCT): CPT

## 2020-11-30 PROCEDURE — 36415 COLL VENOUS BLD VENIPUNCTURE: CPT

## 2020-11-30 PROCEDURE — 97116 GAIT TRAINING THERAPY: CPT | Mod: GP

## 2020-11-30 PROCEDURE — 80076 HEPATIC FUNCTION PANEL: CPT

## 2020-11-30 PROCEDURE — 71045 X-RAY EXAM CHEST 1 VIEW: CPT | Mod: 26

## 2020-11-30 PROCEDURE — 94761 N-INVAS EAR/PLS OXIMETRY MLT: CPT

## 2020-11-30 PROCEDURE — 74183 MRI ABD W/O CNTR FLWD CNTR: CPT

## 2020-11-30 PROCEDURE — 85379 FIBRIN DEGRADATION QUANT: CPT

## 2020-11-30 PROCEDURE — 97161 PT EVAL LOW COMPLEX 20 MIN: CPT | Mod: GP

## 2020-11-30 PROCEDURE — 86803 HEPATITIS C AB TEST: CPT

## 2020-11-30 PROCEDURE — 71275 CT ANGIOGRAPHY CHEST: CPT

## 2020-11-30 PROCEDURE — 82728 ASSAY OF FERRITIN: CPT

## 2020-11-30 RX ORDER — GUAIFENESIN/DEXTROMETHORPHAN 600MG-30MG
10 TABLET, EXTENDED RELEASE 12 HR ORAL EVERY 4 HOURS
Refills: 0 | Status: DISCONTINUED | OUTPATIENT
Start: 2020-11-30 | End: 2020-12-07

## 2020-11-30 RX ORDER — ENOXAPARIN SODIUM 100 MG/ML
40 INJECTION SUBCUTANEOUS EVERY 12 HOURS
Refills: 0 | Status: DISCONTINUED | OUTPATIENT
Start: 2020-11-30 | End: 2020-12-07

## 2020-11-30 RX ORDER — ONDANSETRON 8 MG/1
4 TABLET, FILM COATED ORAL EVERY 6 HOURS
Refills: 0 | Status: DISCONTINUED | OUTPATIENT
Start: 2020-11-30 | End: 2020-12-07

## 2020-11-30 RX ORDER — DEXAMETHASONE 0.5 MG/5ML
6 ELIXIR ORAL DAILY
Refills: 0 | Status: DISCONTINUED | OUTPATIENT
Start: 2020-12-01 | End: 2020-12-07

## 2020-11-30 RX ORDER — ACETAMINOPHEN 500 MG
650 TABLET ORAL ONCE
Refills: 0 | Status: COMPLETED | OUTPATIENT
Start: 2020-11-30 | End: 2020-11-30

## 2020-11-30 RX ORDER — SIMVASTATIN 20 MG/1
40 TABLET, FILM COATED ORAL AT BEDTIME
Refills: 0 | Status: DISCONTINUED | OUTPATIENT
Start: 2020-11-30 | End: 2020-12-07

## 2020-11-30 RX ORDER — ACETAMINOPHEN 500 MG
650 TABLET ORAL EVERY 4 HOURS
Refills: 0 | Status: DISCONTINUED | OUTPATIENT
Start: 2020-11-30 | End: 2020-12-05

## 2020-11-30 RX ORDER — DEXAMETHASONE 0.5 MG/5ML
6 ELIXIR ORAL ONCE
Refills: 0 | Status: COMPLETED | OUTPATIENT
Start: 2020-11-30 | End: 2020-11-30

## 2020-11-30 RX ORDER — ALBUTEROL 90 UG/1
2 AEROSOL, METERED ORAL EVERY 6 HOURS
Refills: 0 | Status: DISCONTINUED | OUTPATIENT
Start: 2020-11-30 | End: 2020-12-07

## 2020-11-30 RX ORDER — LOSARTAN POTASSIUM 100 MG/1
25 TABLET, FILM COATED ORAL DAILY
Refills: 0 | Status: DISCONTINUED | OUTPATIENT
Start: 2020-11-30 | End: 2020-12-07

## 2020-11-30 RX ORDER — PANTOPRAZOLE SODIUM 20 MG/1
40 TABLET, DELAYED RELEASE ORAL DAILY
Refills: 0 | Status: DISCONTINUED | OUTPATIENT
Start: 2020-11-30 | End: 2020-12-07

## 2020-11-30 RX ADMIN — PANTOPRAZOLE SODIUM 40 MILLIGRAM(S): 20 TABLET, DELAYED RELEASE ORAL at 17:50

## 2020-11-30 RX ADMIN — Medication 6 MILLIGRAM(S): at 13:32

## 2020-11-30 RX ADMIN — SIMVASTATIN 40 MILLIGRAM(S): 20 TABLET, FILM COATED ORAL at 22:29

## 2020-11-30 RX ADMIN — ENOXAPARIN SODIUM 40 MILLIGRAM(S): 100 INJECTION SUBCUTANEOUS at 21:25

## 2020-11-30 RX ADMIN — Medication 650 MILLIGRAM(S): at 15:06

## 2020-11-30 RX ADMIN — Medication 650 MILLIGRAM(S): at 15:40

## 2020-11-30 NOTE — ED PROVIDER NOTE - OBJECTIVE STATEMENT
71 y/o female with a PMHx of HTN, HLD presents to the ED c/o SOB x6 days. Pt reports family member developed fever 2 days ago and she went for COVID testing. Pt had rapid COVID resulting positive, PCR pending. +worsening LOVING, +mild back pain. Denies fevers, chills. No other complaints at this time.

## 2020-11-30 NOTE — ED PROVIDER NOTE - CLINICAL SUMMARY MEDICAL DECISION MAKING FREE TEXT BOX
Pt sat 89 % on RA on arrival with significant LOVING. Suspect worsening COVID PNA. Will require admission.

## 2020-11-30 NOTE — H&P ADULT - NSHPLABSRESULTS_GEN_ALL_CORE
LABS: All Labs Reviewed:                        13.2   5.66  )-----------( 136      ( 30 Nov 2020 11:24 )             39.6     11-30    139  |  104  |  13  ----------------------------<  118<H>  3.1<L>   |  27  |  0.84    Ca    8.6      30 Nov 2020 11:24    TPro  7.2  /  Alb  3.5  /  TBili  0.6  /  DBili  x   /  AST  45<H>  /  ALT  25  /  AlkPhos  67  11-30      CARDIAC MARKERS ( 30 Nov 2020 11:24 )  <0.015 ng/mL / x     / x     / x     / x              Blood Culture:         < from: Xray Chest 1 View-PORTABLE IMMEDIATE (11.30.20 @ 11:40) >      IMPRESSION:    Cardiomegaly without focal consolidation      < end of copied text >

## 2020-11-30 NOTE — ED ADULT NURSE NOTE - OBJECTIVE STATEMENT
pt presents to the ED c/o SOB x6 days. Pt reports family member developed fever 2 days ago and she went for COVID testing. Pt had rapid COVID resulting positive, PCR pending. +worsening LOVING, +mild back pain. Denies fevers, chills. Pt AOx4, breathing mildly labored, 2L O2 applied, EKG completed, cardiac monitoring in place, #20 IV inserted into R. AC, blood and blood cultures drawn, pt swabbed for COVID, all sent to lab. will continue to monitor.

## 2020-11-30 NOTE — ED ADULT TRIAGE NOTE - RESPIRATORY RATE (BREATHS/MIN)
SUBJECTIVE:   Sara Hobson is a 42 year old female who presents to clinic today for the following health issues:      Back Pain       Duration: X 1 week        Specific cause: none    Description:   Location of pain: low back   Character of pain: sharp pain near back - dull ache near hip, leg  Pain radiation:radiates into the right buttocks and radiates into the right leg  New numbness or weakness in legs, not attributed to pain:  no     Intensity: moderate    History:   Pain interferes with job: YES  History of back problems: previous back problems - bulging, torn disc  Any previous MRI or X-rays: Yes-- 12 years ago  Sees a specialist for back pain:  No  Therapies tried without relief: Ibuprofen 600, Flexeril, chiropractor and cold    Alleviating factors:   Improved by: Ibuprofen, Flexeril     Precipitating factors:  Worsened by: Bending, Sitting and Walking    Functional and Psychosocial Screen (Sophia STarT Back):      Not performed today    H/O chronic LBP, mild, with severe flare ups. Was blow drying her hair and coughed and heard a pop and felt felt sudden LBP with radiation into both buttocks. Tightness down r posterior thicg into the top of the posterior calf.     Chiro treatment x 1 week, no significant relief.    Last flare up summer 2017      No Known Allergies    Past Medical History:   Diagnosis Date     NO ACTIVE PROBLEMS          Current Outpatient Prescriptions on File Prior to Visit:  cyclobenzaprine (FLEXERIL) 5 MG tablet May take 1 or 2 tablets 3 times a day as needed for muscle spasm and pain. Sedating. Preferably take at bedtime   levonorgestrel (MIRENA) 20 MCG/24HR IUD 1 each by Intrauterine route once     No current facility-administered medications on file prior to visit.     Past Surgical History:   Procedure Laterality Date     No History       NO HISTORY OF SURGERY         Social History     Social History     Marital status: Single     Spouse name: N/A     Number of children: N/A      Years of education: N/A     Occupational History     Not on file.     Social History Main Topics     Smoking status: Current Every Day Smoker     Packs/day: 0.50     Years: 20.00     Types: Cigarettes     Smokeless tobacco: Never Used     Alcohol use Yes     Drug use: No     Sexual activity: Yes     Partners: Male     Birth control/ protection: IUD      Comment: mirena     Other Topics Concern     Not on file     Social History Narrative       REVIEW OF SYSTEMS  General: negative for fever  Resp: negative for chest pain   CV: negative for chest pain  : negative for dysuria , incontinence, frequency  Musculoskeletal: as above  Neurologic: negative for ataxia, saddle anesthesia, fecal incontinence, one sided weakness,  paresthesias    Physical Exam:  Vitals: /80  Pulse 100  Temp 97  F (36.1  C) (Oral)  Wt 170 lb (77.1 kg)  SpO2 98%  BMI 30.11 kg/m2  BMI= Body mass index is 30.11 kg/(m^2).  Constitutional: healthy, alert and no acute distress   CARDIO: RRR, no MRG  RESP: lungs CTA, NAD  NEURO: Patellar and ankle reflexes intact and equal b/l  BACK:  Straight leg raise intact,LBP only. Bilateral paraspinal muscle TTP, strength intact and equal b/l lower extremities  Lumbar spine- NT. Sacroiliac joints NT. Sciatic notches NT. Decreased lumbar flex/ext, both with pain.  GAIT: slow antalgic gait.  Psychiatric: mentation appears normal and affect normal/bright      Impression:     ICD-10-CM    1. Acute bilateral low back pain with right-sided sciatica M54.41 methylPREDNISolone (MEDROL DOSEPAK) 4 MG tablet     cyclobenzaprine (FLEXERIL) 10 MG tablet     naproxen (NAPROSYN) 500 MG tablet     HYDROcodone-acetaminophen (NORCO) 5-325 MG per tablet     ORTHO  REFERRAL       Plan: DC [place]. See spine specialist. Do not start Naprosyn until done with the MDP. Has Gabapentin she can also start.  Instructions for back care and return precautions discussed.        Brianne Tellez PA-C           20

## 2020-11-30 NOTE — H&P ADULT - ASSESSMENT
A:  Acute hypoxemic respiratory failure 2/2 to interstitial pneumonia secondary to COVID-19      P:  - Admit to MS  - CXR noted. CTPE pending results  - Pulseox q6  - trop negative. EKG: no ischemic pattern  - O2 supplemental and maintain SpO2>92%  - IV decadron 6mg IV qd for 5-10 days  - ID consult for poss remdesivir/convalescent plasma  - obtain COVID abx  - Obtain baseline and inflammatory markers  - Limit use of NSAIDs  - Albuterol MDI to limit aerosolization  - potassium supplementation  - hold HCTZ for now  - DVT px: COVID patients to be start on LMWH as per recent data supporting hypercoagulable state especially with high dimers with no absolute CI to its use ie GI bleed or other stigmata of bleeding within last 3 months or PLTs < 50 - awaiting nursing to place HT/WT to dose LMWH accordingly    Fullcode           A:  Acute hypoxemic respiratory failure 2/2 to interstitial pneumonia secondary to COVID-19      P:  - Admit to MS  - CXR noted. CTPE pending results  - Pulseox q6  - trop negative. EKG: no ischemic pattern  - O2 supplemental and maintain SpO2>92%  - IV decadron 6mg IV qd for 5-10 days  - ID consult for poss remdesivir/convalescent plasma  - obtain COVID abx  - Obtain baseline and inflammatory markers  - Limit use of NSAIDs  - Albuterol MDI to limit aerosolization  - potassium supplementation  - hold HCTZ for now  - DVT px: COVID patients to be start on LMWH as per recent data supporting hypercoagulable state especially with high dimers with no absolute CI to its use ie GI bleed or other stigmata of bleeding within last 3 months or PLTs < 50.    Fullcode however she would like to speak with her son regarding her code status in detail. For now reamins full code and follow up with patient in am           A:  Acute hypoxemic respiratory failure 2/2 to interstitial pneumonia suspected to be  secondary to COVID-19      P:  - Admit to MS  - COVID PCR in progress  - CXR noted. CTPE pending results  - Pulseox q6  - trop negative. EKG: no ischemic pattern  - O2 supplemental and maintain SpO2>92%  - IV decadron 6mg IV qd for 5-10 days  - ID consult for poss remdesivir/convalescent plasma, await pcr  - obtain COVID abx  - Obtain baseline and inflammatory markers  - Limit use of NSAIDs  - Albuterol MDI to limit aerosolization  - potassium supplementation  - hold HCTZ for now  - DVT px: COVID patients to be start on LMWH as per recent data supporting hypercoagulable state especially with high dimers with no absolute CI to its use ie GI bleed or other stigmata of bleeding within last 3 months or PLTs < 50.    Fullcode however she would like to speak with her son regarding her code status in detail. For now reamins full code and follow up with patient in am

## 2020-11-30 NOTE — H&P ADULT - NSHPREVIEWOFSYSTEMS_GEN_ALL_CORE
REVIEW OF SYSTEMS:    CONSTITUTIONAL: No weakness, fevers or chills  EYES/ENT: No visual changes;  No vertigo or throat pain   NECK: No pain or stiffness  RESPIRATORY: + shortness of breath  CARDIOVASCULAR: No chest pain or palpitations  GASTROINTESTINAL: No abdominal or epigastric pain. No nausea, vomiting, or hematemesis; No diarrhea or constipation. No melena or hematochezia.  GENITOURINARY: No dysuria, frequency or hematuria  NEUROLOGICAL: No numbness or weakness  SKIN: No itching, burning, rashes, or lesions   All other review of systems is negative unless indicated above

## 2020-11-30 NOTE — H&P ADULT - NSHPPHYSICALEXAM_GEN_ALL_CORE
ICU Vital Signs Last 24 Hrs  T(C): 36.8 (30 Nov 2020 11:12), Max: 36.8 (30 Nov 2020 11:12)  T(F): 98.3 (30 Nov 2020 11:12), Max: 98.3 (30 Nov 2020 11:12)  HR: 103 (30 Nov 2020 11:12) (103 - 103)  BP: 153/78 (30 Nov 2020 11:12) (153/78 - 153/78)  BP(mean): --  ABP: --  ABP(mean): --  RR: 20 (30 Nov 2020 11:44) (20 - 20)  SpO2: 98% (30 Nov 2020 11:44) (89% - 98%)      PHYSICAL EXAM:    Constitutional: NAD, awake and alert, well-developed  HEENT: PERR, EOMI, Normal Hearing, MMM  Neck: Soft and supple, No LAD, No JVD  Respiratory: Breath sounds are clear bilaterally, No wheezing, rales or rhonchi  Cardiovascular: S1 and S2, regular rate and rhythm, no Murmurs, gallops or rubs  Gastrointestinal: Bowel Sounds present, soft, nontender, nondistended, no guarding, no rebound  Extremities: No peripheral edema  Vascular: 2+ peripheral pulses  Neurological: A/O x 3, no focal deficits  Musculoskeletal: 5/5 strength b/l upper and lower extremities  Skin: No rashes

## 2020-11-30 NOTE — H&P ADULT - HISTORY OF PRESENT ILLNESS
69 yo F pmh HTN, hyperlipidemia presents w/ sob. Endorses positive family contact .   Dyspnea worse on exertion and having associated myalgias. No fevers or chills. No loss of sense of smell or taste. No cough. No N/V/D    ED course: CTPE performed. Was hypoxic on arrival to 89% place on NC. Given decadron 6mg IV x 1.     Social: no toxic habits  Shx: none  Fhx: none  EKG : NSR no st-t changes 71 yo F pmh HTN, hyperlipidemia presents w/ sob. Endorses positive family contact .   Dyspnea worse on exertion and having associated myalgias. No fevers or chills. No loss of sense of smell or taste. No cough. No N/V/D    ED course: CTPE performed and pending results.. Was hypoxic on arrival to 89% place on NC. Given decadron 6mg IV x 1.     Social: no toxic habits  Shx: none  Fhx: none  EKG : NSR no st-t changes

## 2020-11-30 NOTE — ED ADULT NURSE NOTE - NSIMPLEMENTINTERV_GEN_ALL_ED
Implemented All Fall with Harm Risk Interventions:  [place] to call system. Call bell, personal items and telephone within reach. Instruct patient to call for assistance. Room bathroom lighting operational. Non-slip footwear when patient is off stretcher. Physically safe environment: no spills, clutter or unnecessary equipment. Stretcher in lowest position, wheels locked, appropriate side rails in place. Provide visual cue, wrist band, yellow gown, etc. Monitor gait and stability. Monitor for mental status changes and reorient to person, place, and time. Review medications for side effects contributing to fall risk. Reinforce activity limits and safety measures with patient and family. Provide visual clues: red socks.

## 2020-11-30 NOTE — ED ADULT TRIAGE NOTE - CHIEF COMPLAINT QUOTE
pt BIBA for worsening SOB since Wednesday.  The patient c/o core throat, HA, cough, chest tightness, Rapid Sars-Cov-2 + on Friday still waiting on PCR testing.

## 2020-12-01 LAB
ALBUMIN SERPL ELPH-MCNC: 3.5 G/DL — SIGNIFICANT CHANGE UP (ref 3.3–5)
ALP SERPL-CCNC: 76 U/L — SIGNIFICANT CHANGE UP (ref 40–120)
ALT FLD-CCNC: 40 U/L — SIGNIFICANT CHANGE UP (ref 12–78)
AST SERPL-CCNC: 57 U/L — HIGH (ref 15–37)
BILIRUB DIRECT SERPL-MCNC: 0.1 MG/DL — SIGNIFICANT CHANGE UP (ref 0–0.2)
BILIRUB INDIRECT FLD-MCNC: 0.3 MG/DL — SIGNIFICANT CHANGE UP (ref 0.2–1)
BILIRUB SERPL-MCNC: 0.4 MG/DL — SIGNIFICANT CHANGE UP (ref 0.2–1.2)
CREAT SERPL-MCNC: 0.86 MG/DL — SIGNIFICANT CHANGE UP (ref 0.5–1.3)
FERRITIN SERPL-MCNC: 299 NG/ML — HIGH (ref 15–150)
PROCALCITONIN SERPL-MCNC: 0.44 NG/ML — HIGH (ref 0.02–0.1)
PROT SERPL-MCNC: 7.4 GM/DL — SIGNIFICANT CHANGE UP (ref 6–8.3)

## 2020-12-01 PROCEDURE — 99232 SBSQ HOSP IP/OBS MODERATE 35: CPT

## 2020-12-01 RX ADMIN — SIMVASTATIN 40 MILLIGRAM(S): 20 TABLET, FILM COATED ORAL at 22:42

## 2020-12-01 RX ADMIN — LOSARTAN POTASSIUM 25 MILLIGRAM(S): 100 TABLET, FILM COATED ORAL at 08:25

## 2020-12-01 RX ADMIN — Medication 6 MILLIGRAM(S): at 06:01

## 2020-12-01 RX ADMIN — ENOXAPARIN SODIUM 40 MILLIGRAM(S): 100 INJECTION SUBCUTANEOUS at 08:24

## 2020-12-01 RX ADMIN — PANTOPRAZOLE SODIUM 40 MILLIGRAM(S): 20 TABLET, DELAYED RELEASE ORAL at 08:25

## 2020-12-01 RX ADMIN — ENOXAPARIN SODIUM 40 MILLIGRAM(S): 100 INJECTION SUBCUTANEOUS at 22:42

## 2020-12-01 NOTE — CONSULT NOTE ADULT - SUBJECTIVE AND OBJECTIVE BOX
Patient is a 70y old  Female who presents with a chief complaint of sob (30 Nov 2020 14:14)    HPI:  69 yo F pmh HTN, hyperlipidemia presents on 11/30 for evaluation of shortness of breath and purple lips, myalgia; upon admission was hypoxic to 89%; the patient had COVID-19 exposure on 11/26; subsequent outpatient COVID-19 testing was positive, however her symptoms progressed over the weekend and she came to ED on 11/30. No GI symptoms, no travel.         PMH: as above  PSH: as above  Meds: per reconciliation sheet, noted below  MEDICATIONS  (STANDING):  dexAMETHasone  Injectable 6 milliGRAM(s) IV Push daily  enoxaparin Injectable 40 milliGRAM(s) SubCutaneous every 12 hours  losartan 25 milliGRAM(s) Oral daily  pantoprazole    Tablet 40 milliGRAM(s) Oral daily  remdesivir  IVPB   IV Intermittent   simvastatin 40 milliGRAM(s) Oral at bedtime    MEDICATIONS  (PRN):  acetaminophen   Tablet .. 650 milliGRAM(s) Oral every 4 hours PRN Temp greater or equal to 38.5C (101.3F)  ALBUTerol    90 MICROgram(s) HFA Inhaler 2 Puff(s) Inhalation every 6 hours PRN Bronchospasm  guaifenesin/dextromethorphan  Syrup 10 milliLiter(s) Oral every 4 hours PRN Cough  ondansetron Injectable 4 milliGRAM(s) IV Push every 6 hours PRN Nausea and/or Vomiting    Allergies    No Known Allergies    Intolerances      Social: no smoking, no alcohol, no illegal drugs; no recent travel, no exposure to TB  FAMILY HISTORY:     no history of premature cardiovascular disease in first degree relatives  ROS: the patient has no chills, no HA, no dizziness, no sore throat, no blurry vision, no CP, no palpitations, no abdominal pain, no diarrhea, no N/V, no dysuria, no leg pain, no claudication, no rash, no joint aches, no rectal pain or bleeding, no night sweats  All other systems reviewed and are negative    Vital Signs Last 24 Hrs  T(C): 36.5 (01 Dec 2020 08:32), Max: 37.8 (30 Nov 2020 14:59)  T(F): 97.7 (01 Dec 2020 08:32), Max: 100 (30 Nov 2020 14:59)  HR: 77 (01 Dec 2020 08:32) (75 - 103)  BP: 115/73 (01 Dec 2020 08:32) (109/53 - 153/78)  BP(mean): 64 (30 Nov 2020 14:59) (64 - 64)  RR: 18 (01 Dec 2020 08:32) (18 - 20)  SpO2: 97% (01 Dec 2020 08:32) (89% - 98%)  Daily     Daily     PE:    Constitutional: frail looking  HEENT: NC/AT, EOMI, PERRLA, conjunctivae clear; ears and nose atraumatic; pharynx clear  Neck: supple; thyroid not palpable  Back: no tenderness  Respiratory: respiratory effort normal; clear to auscultation with scattered coarse breath sounds  Cardiovascular: S1S2 regular, no murmurs  Abdomen: soft, not tender, not distended, positive BS; no liver or spleen organomegaly  Genitourinary: no suprapubic tenderness  Musculoskeletal: no muscle tenderness, no joint swelling or tenderness  Neurological/ Psychiatric: AxOx3, judgement and insight normal;  moving all extremities  Skin: no rashes; no palpable lesions    Labs: all available labs reviewed                        13.2   5.66  )-----------( 136      ( 30 Nov 2020 11:24 )             39.6     11-30    139  |  104  |  13  ----------------------------<  118<H>  3.1<L>   |  27  |  0.84    Ca    8.6      30 Nov 2020 11:24  Phos  2.6     11-30  Mg     2.2     11-30    TPro  7.2  /  Alb  3.5  /  TBili  0.6  /  DBili  x   /  AST  45<H>  /  ALT  25  /  AlkPhos  67  11-30     LIVER FUNCTIONS - ( 30 Nov 2020 11:24 )  Alb: 3.5 g/dL / Pro: 7.2 gm/dL / ALK PHOS: 67 U/L / ALT: 25 U/L / AST: 45 U/L / GGT: x           COVID-19 PCR . (11.30.20 @ 11:24)    COVID-19 PCR: Detected: Testing is performed using polymerase chain reaction (PCR) or  transcription mediated amplification (TMA). This COVID-19 (SARS-CoV-2)  nucleic acid amplification test was validated by [facility] and is  in use under the FDA Emergency Use Authorization (EUA) for clinical labs  CLIA-certified to perform high complexity testing. Test results should be  correlated with clinical presentation, patient history, and epidemiology.          Radiology: all available radiological tests reviewed  < from: CT Angio Chest PE Protocol w/ IV Cont (11.30.20 @ 13:20) >    EXAM:  CTA CHEST PE PROTOCOL (W)AW IC                            PROCEDURE DATE:  11/30/2020          INTERPRETATION:  CLINICAL INFORMATION: Shortness of breath, elevated d-dimer    COMPARISON: None.    PROCEDURE:  CT Angiography of the Chest.  90 ml of Omnipaque 350 was injected intravenously. 10 ml were discarded.  Sagittal and coronal reformats were performed as well as 3D (MIP) reconstructions.    FINDINGS:    LUNGS AND AIRWAYS: Patent central airways.  Scattered nonspecific groundglass opacities with areas of interlobular septal thickening may represent air trapping versus pulmonary edema versus chronic fibrosis versus unclear etiology. Minimal bibasilar atelectasis. No segmental consolidations.  PLEURA: Trace right pleural effusion.  MEDIASTINUM AND GLENROY: 2.2 cm right hilar lymph node. Subcarinal lymph node measuring 1.8 cm. Additional subcentimeter right hilar lymph nodes. Hiatal hernia containing intrathoracic component of proximal stomach.  VESSELS: Atherosclerotic changes of the aorta and coronary vasculature. No aortic aneurysm. No evidence of acute pulmonary embolism.  HEART: Heart size is normal. No pericardial effusion.  CHEST WALL AND LOWER NECK: Within normal limits.  VISUALIZED UPPER ABDOMEN: 1.2 cm right adrenal nodule, indeterminate in nature on current study. Subtle hypovascular lesion in the left hepatic lobe indeterminate in nature. Hepatic steatosis. Question gallstones. MRI of the abdomen is recommended for further evaluation.  BONES: Degenerative changes. Small sclerotic density in the posterior medial left 11th rib.    IMPRESSION:  No evidence of acute pulmonary embolism.    Indeterminate right hilar and subcarinal adenopathy. Scattered groundglass opacities with intralobular septal thickening may represent pulmonary edema versus air trapping versus mild chronic fibrosis, correlate clinically. No segmental consolidations. Minimal bibasilar atelectasis.    Indeterminate right adrenal nodule. Indeterminate hypovascular lesion in the left hepatic lobe. MRI of the abdomen with and without contrast is recommended for further evaluation.    Hiatal hernia with an intrathoracic component of proximal stomach      < end of copied text >    Advanced directives addressed: full resuscitation

## 2020-12-01 NOTE — PROGRESS NOTE ADULT - ASSESSMENT
A:  Acute hypoxemic respiratory failure 2/2 to interstitial pneumonia due to COVID-19.    P:  - SpO2 q6h.  - O2 supplemental and maintain SpO2>92%.  - IV decadron 6mg IV qd for 5-10 days.  - Remdesivir.  - TONEY MDI.  - DVT prophylaxis:  LMWH.    R adrenal nodule.  L hepatic lobe lesion.  - incidental indeterminate findings on CT imaging.  - MR of AB w/wo IVC recommended.

## 2020-12-01 NOTE — PROGRESS NOTE ADULT - SUBJECTIVE AND OBJECTIVE BOX
CC:  Patient is a 70y old  Female who presents with a chief complaint of sob (01 Dec 2020 09:41)    SUBJECTIVE:     -no new complaints or issues at current time.    ROS:  all other review of systems are negative unless indicated above.    acetaminophen   Tablet .. 650 milliGRAM(s) Oral every 4 hours PRN  ALBUTerol    90 MICROgram(s) HFA Inhaler 2 Puff(s) Inhalation every 6 hours PRN  dexAMETHasone  Injectable 6 milliGRAM(s) IV Push daily  enoxaparin Injectable 40 milliGRAM(s) SubCutaneous every 12 hours  guaifenesin/dextromethorphan  Syrup 10 milliLiter(s) Oral every 4 hours PRN  losartan 25 milliGRAM(s) Oral daily  ondansetron Injectable 4 milliGRAM(s) IV Push every 6 hours PRN  pantoprazole    Tablet 40 milliGRAM(s) Oral daily  remdesivir  IVPB   IV Intermittent   simvastatin 40 milliGRAM(s) Oral at bedtime    T(C): 36.5 (12-01-20 @ 08:32), Max: 37.8 (11-30-20 @ 14:59)  HR: 77 (12-01-20 @ 08:32) (75 - 88)  BP: 115/73 (12-01-20 @ 08:32) (109/53 - 118/69)  RR: 18 (12-01-20 @ 08:32) (18 - 18)  SpO2: 97% (12-01-20 @ 08:32) (97% - 98%)    Constitutional: NAD.   HEENT: PERRL, EOMI, MMM.  Neck: Soft and supple, No carotid bruit, No JVD  Respiratory: Breath sounds are clear bilaterally, No wheezing, rales or rhonchi  Cardiovascular: S1 and S2, regular rate and rhythm, no murmur, rub or gallop.  Gastrointestinal: Bowel Sounds present, soft, nontender, nondistended, no guarding, no rebound, no mass.  Extremities: No peripheral edema  Vascular: 2+ peripheral pulses  Neurological: A/O x , no focal deficits  Musculoskeletal: 5/5 strength b/l upper and lower extremities  Skin:  no visible rashes.                         13.2   5.66  )-----------( 136      ( 30 Nov 2020 11:24 )             39.6       12-01    x   |  x   |  x   ----------------------------<  x   x    |  x   |  0.86    Ca    8.6      30 Nov 2020 11:24  Phos  2.6     11-30  Mg     2.2     11-30    TPro  7.4  /  Alb  3.5  /  TBili  0.4  /  DBili  0.1  /  AST  57<H>  /  ALT  40  /  AlkPhos  76  12-01    < from: CT Angio Chest PE Protocol w/ IV Cont (11.30.20 @ 13:20) >  IMPRESSION:  No evidence of acute pulmonary embolism.    Indeterminate right hilar and subcarinal adenopathy. Scattered groundglass opacities with intralobular septal thickening may represent pulmonary edema versus air trapping versus mild chronic fibrosis, correlate clinically. No segmental consolidations. Minimal bibasilar atelectasis.    Indeterminate right adrenal nodule. Indeterminate hypovascular lesion in the left hepatic lobe. MRI of the abdomen with and without contrast is recommended for further evaluation.    Hiatal hernia with an intrathoracic component of proximal stomach    < end of copied text >

## 2020-12-01 NOTE — CONSULT NOTE ADULT - ASSESSMENT
71 yo F pmh HTN, hyperlipidemia presents on 11/30 for evaluation of shortness of breath and purple lips, myalgia; upon admission was hypoxic to 89%; the patient had COVID-19 exposure on 11/26; subsequent outpatient COVID-19 testing was positive, however her symptoms progressed over the weekend and she came to ED on 11/30. No GI symptoms, no travel.     1. Patient admitted with viral syndrome associated with COVID-19 infection  - follow up cultures   - oxygen and nebs as needed   - serial cbc and monitor temperature   - reviewed prior medical records to evaluate for resistant or atypical pathogens   - continue isolation  - agree with steroids as ordered  - will start remdesivir; discussed side effects with patient and she is agreeable to medication  - monitor renal and liver function while on remdesivir  - monitor off antibiotics at this time  - will not give convalescent plasma given that patient has been COVID-19 positive for greater than 6 days  2. other issues: per medicine

## 2020-12-02 LAB
ALBUMIN SERPL ELPH-MCNC: 3.2 G/DL — LOW (ref 3.3–5)
ALP SERPL-CCNC: 73 U/L — SIGNIFICANT CHANGE UP (ref 40–120)
ALT FLD-CCNC: 33 U/L — SIGNIFICANT CHANGE UP (ref 12–78)
AST SERPL-CCNC: 31 U/L — SIGNIFICANT CHANGE UP (ref 15–37)
BILIRUB DIRECT SERPL-MCNC: 0.1 MG/DL — SIGNIFICANT CHANGE UP (ref 0–0.2)
BILIRUB INDIRECT FLD-MCNC: 0.2 MG/DL — SIGNIFICANT CHANGE UP (ref 0.2–1)
BILIRUB SERPL-MCNC: 0.3 MG/DL — SIGNIFICANT CHANGE UP (ref 0.2–1.2)
CREAT SERPL-MCNC: 0.84 MG/DL — SIGNIFICANT CHANGE UP (ref 0.5–1.3)
PROT SERPL-MCNC: 6.9 GM/DL — SIGNIFICANT CHANGE UP (ref 6–8.3)

## 2020-12-02 PROCEDURE — 99232 SBSQ HOSP IP/OBS MODERATE 35: CPT

## 2020-12-02 RX ADMIN — LOSARTAN POTASSIUM 25 MILLIGRAM(S): 100 TABLET, FILM COATED ORAL at 09:52

## 2020-12-02 RX ADMIN — ENOXAPARIN SODIUM 40 MILLIGRAM(S): 100 INJECTION SUBCUTANEOUS at 21:43

## 2020-12-02 RX ADMIN — ENOXAPARIN SODIUM 40 MILLIGRAM(S): 100 INJECTION SUBCUTANEOUS at 09:52

## 2020-12-02 RX ADMIN — PANTOPRAZOLE SODIUM 40 MILLIGRAM(S): 20 TABLET, DELAYED RELEASE ORAL at 09:52

## 2020-12-02 RX ADMIN — SIMVASTATIN 40 MILLIGRAM(S): 20 TABLET, FILM COATED ORAL at 21:43

## 2020-12-02 RX ADMIN — Medication 6 MILLIGRAM(S): at 06:29

## 2020-12-02 NOTE — PROGRESS NOTE ADULT - ASSESSMENT
Acute hypoxemic respiratory failure 2/2 to interstitial pneumonia due to COVID-19.  - SpO2 q6h.  - O2 supplemental and maintain SpO2>92%.  - IV decadron 6mg IV qd for 5-10 days.  - Remdesivir.  - TONEY MDI.  - DVT prophylaxis:  LMWH.    R adrenal nodule.  L hepatic lobe lesion.  - incidental indeterminate findings on CT imaging.  - MR of AB w/wo IVC recommended.

## 2020-12-02 NOTE — PROGRESS NOTE ADULT - SUBJECTIVE AND OBJECTIVE BOX
CC:  Patient is a 70y old  Female who presents with a chief complaint of sob (02 Dec 2020 12:36)    SUBJECTIVE:     -no new complaints or issues at current time.    ROS:  all other review of systems are negative unless indicated above.    acetaminophen   Tablet .. 650 milliGRAM(s) Oral every 4 hours PRN  ALBUTerol    90 MICROgram(s) HFA Inhaler 2 Puff(s) Inhalation every 6 hours PRN  dexAMETHasone  Injectable 6 milliGRAM(s) IV Push daily  enoxaparin Injectable 40 milliGRAM(s) SubCutaneous every 12 hours  guaifenesin/dextromethorphan  Syrup 10 milliLiter(s) Oral every 4 hours PRN  losartan 25 milliGRAM(s) Oral daily  ondansetron Injectable 4 milliGRAM(s) IV Push every 6 hours PRN  pantoprazole    Tablet 40 milliGRAM(s) Oral daily  remdesivir  IVPB   IV Intermittent   remdesivir  IVPB 100 milliGRAM(s) IV Intermittent every 24 hours  simvastatin 40 milliGRAM(s) Oral at bedtime    T(C): 37.1 (12-02-20 @ 09:40), Max: 37.3 (12-01-20 @ 23:50)  HR: 86 (12-02-20 @ 09:40) (73 - 86)  BP: 120/54 (12-02-20 @ 09:40) (105/65 - 120/54)  RR: 18 (12-02-20 @ 09:40) (18 - 18)  SpO2: 98% (12-02-20 @ 09:40) (94% - 98%)    Constitutional: NAD.   HEENT: PERRL, EOMI, MMM.  Neck: Soft and supple, No carotid bruit, No JVD  Respiratory: Breath sounds are clear bilaterally, No wheezing, rales or rhonchi  Cardiovascular: S1 and S2, regular rate and rhythm, no murmur, rub or gallop.  Gastrointestinal: Bowel Sounds present, soft, nontender, nondistended, no guarding, no rebound, no mass.  Extremities: No peripheral edema  Vascular: 2+ peripheral pulses  Neurological: A/O x , no focal deficits  Musculoskeletal: 5/5 strength b/l upper and lower extremities  Skin:  no visible rashes.       12-02    x   |  x   |  x   ----------------------------<  x   x    |  x   |  0.84    Phos  2.6     11-30  Mg     2.2     11-30    TPro  6.9  /  Alb  3.2<L>  /  TBili  0.3  /  DBili  0.1  /  AST  31  /  ALT  33  /  AlkPhos  73  12-02

## 2020-12-02 NOTE — PROVIDER CONTACT NOTE (OTHER) - SITUATION
notified Dr Knapp's office of admission please fax over d/c paperwork to [phone] once pt is d.c

## 2020-12-02 NOTE — PROGRESS NOTE ADULT - SUBJECTIVE AND OBJECTIVE BOX
Date of service: 12-02-20 @ 12:36      Patient lying in bed; afebrile, less cough, less shortness of breath      ROS: unable to obtain secondary to patient medical condition     MEDICATIONS  (STANDING):  dexAMETHasone  Injectable 6 milliGRAM(s) IV Push daily  enoxaparin Injectable 40 milliGRAM(s) SubCutaneous every 12 hours  losartan 25 milliGRAM(s) Oral daily  pantoprazole    Tablet 40 milliGRAM(s) Oral daily  remdesivir  IVPB   IV Intermittent   remdesivir  IVPB 100 milliGRAM(s) IV Intermittent every 24 hours  simvastatin 40 milliGRAM(s) Oral at bedtime    MEDICATIONS  (PRN):  acetaminophen   Tablet .. 650 milliGRAM(s) Oral every 4 hours PRN Temp greater or equal to 38.5C (101.3F)  ALBUTerol    90 MICROgram(s) HFA Inhaler 2 Puff(s) Inhalation every 6 hours PRN Bronchospasm  guaifenesin/dextromethorphan  Syrup 10 milliLiter(s) Oral every 4 hours PRN Cough  ondansetron Injectable 4 milliGRAM(s) IV Push every 6 hours PRN Nausea and/or Vomiting      Vital Signs Last 24 Hrs  T(C): 37.1 (02 Dec 2020 09:40), Max: 37.3 (01 Dec 2020 23:50)  T(F): 98.8 (02 Dec 2020 09:40), Max: 99.2 (01 Dec 2020 23:50)  HR: 86 (02 Dec 2020 09:40) (73 - 86)  BP: 120/54 (02 Dec 2020 09:40) (105/65 - 120/89)  BP(mean): --  RR: 18 (02 Dec 2020 09:40) (18 - 18)  SpO2: 98% (02 Dec 2020 09:40) (94% - 98%)        Physical Exam:          PE:    Constitutional: frail looking  HEENT: NC/AT, EOMI, PERRLA, conjunctivae clear; ears and nose atraumatic; pharynx clear  Neck: supple; thyroid not palpable  Back: no tenderness  Respiratory: respiratory effort normal; clear to auscultation with scattered coarse breath sounds  Cardiovascular: S1S2 regular, no murmurs  Abdomen: soft, not tender, not distended, positive BS; no liver or spleen organomegaly  Genitourinary: no suprapubic tenderness  Musculoskeletal: no muscle tenderness, no joint swelling or tenderness  Neurological/ Psychiatric: AxOx3, judgement and insight normal;  moving all extremities  Skin: no rashes; no palpable lesions    Labs: all available labs reviewed                          Labs:    12-02    x   |  x   |  x   ----------------------------<  x   x    |  x   |  0.84    Phos  2.6     11-30  Mg     2.2     11-30    TPro  6.9  /  Alb  3.2<L>  /  TBili  0.3  /  DBili  0.1  /  AST  31  /  ALT  33  /  AlkPhos  73  12-02           Cultures:       Culture - Blood (collected 11-30-20 @ 11:24)  Source: .Blood None  Preliminary Report (12-01-20 @ 15:02):    No growth to date.      Ferritin, Serum: 299 ng/mL (11-30-20 @ 16:48)  C-Reactive Protein, Serum: 3.12 mg/dL (11-30-20 @ 11:24)  D-Dimer Assay, Quantitative: 453 ng/mL DDU (11-30-20 @ 11:24)  Ferritin, Serum: 263 ng/mL (11-30-20 @ 11:24)            COVID-19 PCR . (11.30.20 @ 11:24)    COVID-19 PCR: Detected: Testing is performed using polymerase chain reaction (PCR) or  transcription mediated amplification (TMA). This COVID-19 (SARS-CoV-2)  nucleic acid amplification test was validated by [email or web address] and is  in use under the FDA Emergency Use Authorization (EUA) for clinical labs  CLIA-certified to perform high complexity testing. Test results should be  correlated with clinical presentation, patient history, and epidemiology.          Radiology: all available radiological tests reviewed  < from: CT Angio Chest PE Protocol w/ IV Cont (11.30.20 @ 13:20) >    EXAM:  CTA CHEST PE PROTOCOL (W)AW IC                            PROCEDURE DATE:  11/30/2020          INTERPRETATION:  CLINICAL INFORMATION: Shortness of breath, elevated d-dimer    COMPARISON: None.    PROCEDURE:  CT Angiography of the Chest.  90 ml of Omnipaque 350 was injected intravenously. 10 ml were discarded.  Sagittal and coronal reformats were performed as well as 3D (MIP) reconstructions.    FINDINGS:    LUNGS AND AIRWAYS: Patent central airways.  Scattered nonspecific groundglass opacities with areas of interlobular septal thickening may represent air trapping versus pulmonary edema versus chronic fibrosis versus unclear etiology. Minimal bibasilar atelectasis. No segmental consolidations.  PLEURA: Trace right pleural effusion.  MEDIASTINUM AND GLENROY: 2.2 cm right hilar lymph node. Subcarinal lymph node measuring 1.8 cm. Additional subcentimeter right hilar lymph nodes. Hiatal hernia containing intrathoracic component of proximal stomach.  VESSELS: Atherosclerotic changes of the aorta and coronary vasculature. No aortic aneurysm. No evidence of acute pulmonary embolism.  HEART: Heart size is normal. No pericardial effusion.  CHEST WALL AND LOWER NECK: Within normal limits.  VISUALIZED UPPER ABDOMEN: 1.2 cm right adrenal nodule, indeterminate in nature on current study. Subtle hypovascular lesion in the left hepatic lobe indeterminate in nature. Hepatic steatosis. Question gallstones. MRI of the abdomen is recommended for further evaluation.  BONES: Degenerative changes. Small sclerotic density in the posterior medial left 11th rib.    IMPRESSION:  No evidence of acute pulmonary embolism.    Indeterminate right hilar and subcarinal adenopathy. Scattered groundglass opacities with intralobular septal thickening may represent pulmonary edema versus air trapping versus mild chronic fibrosis, correlate clinically. No segmental consolidations. Minimal bibasilar atelectasis.    Indeterminate right adrenal nodule. Indeterminate hypovascular lesion in the left hepatic lobe. MRI of the abdomen with and without contrast is recommended for further evaluation.    Hiatal hernia with an intrathoracic component of proximal stomach      < end of copied text >    Advanced directives addressed: full resuscitation

## 2020-12-02 NOTE — PROGRESS NOTE ADULT - ASSESSMENT
69 yo F pmh HTN, hyperlipidemia presents on 11/30 for evaluation of shortness of breath and purple lips, myalgia; upon admission was hypoxic to 89%; the patient had COVID-19 exposure on 11/26; subsequent outpatient COVID-19 testing was positive, however her symptoms progressed over the weekend and she came to ED on 11/30. No GI symptoms, no travel.     1. Patient admitted with viral syndrome associated with COVID-19 infection  - follow up cultures   - oxygen and nebs as needed   - serial cbc and monitor temperature   - reviewed prior medical records to evaluate for resistant or atypical pathogens   - continue isolation  - agree with steroids as ordered  - will start remdesivir; discussed side effects with patient and she is agreeable to medication, day #2  - tolerating remdesivir without rashes or side effects   - monitor renal and liver function while on remdesivir  - monitor off antibiotics at this time  - will not give convalescent plasma given that patient has been COVID-19 positive for greater than 6 days  2. other issues: per medicine

## 2020-12-03 ENCOUNTER — TRANSCRIPTION ENCOUNTER (OUTPATIENT)
Age: 70
End: 2020-12-03

## 2020-12-03 LAB
ALBUMIN SERPL ELPH-MCNC: 2.8 G/DL — LOW (ref 3.3–5)
ALP SERPL-CCNC: 73 U/L — SIGNIFICANT CHANGE UP (ref 40–120)
ALT FLD-CCNC: 23 U/L — SIGNIFICANT CHANGE UP (ref 12–78)
AST SERPL-CCNC: 20 U/L — SIGNIFICANT CHANGE UP (ref 15–37)
BILIRUB DIRECT SERPL-MCNC: <0.1 MG/DL — SIGNIFICANT CHANGE UP (ref 0–0.2)
BILIRUB INDIRECT FLD-MCNC: >0.2 MG/DL — SIGNIFICANT CHANGE UP (ref 0.2–1)
BILIRUB SERPL-MCNC: 0.3 MG/DL — SIGNIFICANT CHANGE UP (ref 0.2–1.2)
CREAT SERPL-MCNC: 0.72 MG/DL — SIGNIFICANT CHANGE UP (ref 0.5–1.3)
PROT SERPL-MCNC: 6.4 GM/DL — SIGNIFICANT CHANGE UP (ref 6–8.3)

## 2020-12-03 PROCEDURE — 99232 SBSQ HOSP IP/OBS MODERATE 35: CPT

## 2020-12-03 RX ORDER — POLYETHYLENE GLYCOL 3350 17 G/17G
17 POWDER, FOR SOLUTION ORAL DAILY
Refills: 0 | Status: DISCONTINUED | OUTPATIENT
Start: 2020-12-03 | End: 2020-12-07

## 2020-12-03 RX ORDER — SENNA PLUS 8.6 MG/1
2 TABLET ORAL AT BEDTIME
Refills: 0 | Status: DISCONTINUED | OUTPATIENT
Start: 2020-12-03 | End: 2020-12-07

## 2020-12-03 RX ADMIN — ENOXAPARIN SODIUM 40 MILLIGRAM(S): 100 INJECTION SUBCUTANEOUS at 10:26

## 2020-12-03 RX ADMIN — LOSARTAN POTASSIUM 25 MILLIGRAM(S): 100 TABLET, FILM COATED ORAL at 10:26

## 2020-12-03 RX ADMIN — Medication 6 MILLIGRAM(S): at 05:16

## 2020-12-03 RX ADMIN — Medication 650 MILLIGRAM(S): at 22:32

## 2020-12-03 RX ADMIN — PANTOPRAZOLE SODIUM 40 MILLIGRAM(S): 20 TABLET, DELAYED RELEASE ORAL at 10:26

## 2020-12-03 RX ADMIN — POLYETHYLENE GLYCOL 3350 17 GRAM(S): 17 POWDER, FOR SOLUTION ORAL at 11:19

## 2020-12-03 RX ADMIN — SENNA PLUS 2 TABLET(S): 8.6 TABLET ORAL at 22:32

## 2020-12-03 RX ADMIN — SIMVASTATIN 40 MILLIGRAM(S): 20 TABLET, FILM COATED ORAL at 22:32

## 2020-12-03 RX ADMIN — ENOXAPARIN SODIUM 40 MILLIGRAM(S): 100 INJECTION SUBCUTANEOUS at 22:32

## 2020-12-03 NOTE — PROGRESS NOTE ADULT - SUBJECTIVE AND OBJECTIVE BOX
Date of service: 12-03-20 @ 16:21      Patient becomes more short of breath with ambulation; has a panting feeling with her shortness of breath      ROS: no fever or chills; denies dizziness, no HA,  no abdominal pain, no diarrhea or constipation; no dysuria, no urinary frequency, no legs pain, no rashes    MEDICATIONS  (STANDING):  dexAMETHasone  Injectable 6 milliGRAM(s) IV Push daily  enoxaparin Injectable 40 milliGRAM(s) SubCutaneous every 12 hours  losartan 25 milliGRAM(s) Oral daily  pantoprazole    Tablet 40 milliGRAM(s) Oral daily  remdesivir  IVPB   IV Intermittent   remdesivir  IVPB 100 milliGRAM(s) IV Intermittent every 24 hours  simvastatin 40 milliGRAM(s) Oral at bedtime    MEDICATIONS  (PRN):  acetaminophen   Tablet .. 650 milliGRAM(s) Oral every 4 hours PRN Temp greater or equal to 38.5C (101.3F)  ALBUTerol    90 MICROgram(s) HFA Inhaler 2 Puff(s) Inhalation every 6 hours PRN Bronchospasm  guaifenesin/dextromethorphan  Syrup 10 milliLiter(s) Oral every 4 hours PRN Cough  ondansetron Injectable 4 milliGRAM(s) IV Push every 6 hours PRN Nausea and/or Vomiting  polyethylene glycol 3350 17 Gram(s) Oral daily PRN Constipation  senna 2 Tablet(s) Oral at bedtime PRN Constipation      Vital Signs Last 24 Hrs  T(C): 36.3 (03 Dec 2020 15:00), Max: 36.8 (02 Dec 2020 21:40)  T(F): 97.3 (03 Dec 2020 15:00), Max: 98.2 (02 Dec 2020 21:40)  HR: 80 (03 Dec 2020 15:00) (71 - 80)  BP: 117/71 (03 Dec 2020 15:00) (111/60 - 124/79)  BP(mean): --  RR: 18 (03 Dec 2020 15:00) (16 - 18)  SpO2: 95% (03 Dec 2020 15:00) (95% - 98%)        Physical Exam:      PE:    Constitutional: frail looking  HEENT: NC/AT, EOMI, PERRLA, conjunctivae clear; ears and nose atraumatic; pharynx clear  Neck: supple; thyroid not palpable  Back: no tenderness  Respiratory: respiratory effort normal; clear to auscultation with scattered coarse breath sounds  Cardiovascular: S1S2 regular, no murmurs  Abdomen: soft, not tender, not distended, positive BS; no liver or spleen organomegaly  Genitourinary: no suprapubic tenderness  Musculoskeletal: no muscle tenderness, no joint swelling or tenderness  Neurological/ Psychiatric: AxOx3, judgement and insight normal;  moving all extremities  Skin: no rashes; no palpable lesions    Labs: all available labs reviewed                        Labs:    12-03    x   |  x   |  x   ----------------------------<  x   x    |  x   |  0.72      TPro  6.4  /  Alb  2.8<L>  /  TBili  0.3  /  DBili  <0.1  /  AST  20  /  ALT  23  /  AlkPhos  73  12-03           Cultures:       Culture - Blood (collected 11-30-20 @ 11:24)  Source: .Blood None  Preliminary Report (12-01-20 @ 15:02):    No growth to date.      Ferritin, Serum: 299 ng/mL (11-30-20 @ 16:48)  C-Reactive Protein, Serum: 3.12 mg/dL (11-30-20 @ 11:24)  D-Dimer Assay, Quantitative: 453 ng/mL DDU (11-30-20 @ 11:24)  Ferritin, Serum: 263 ng/mL (11-30-20 @ 11:24)          COVID-19 PCR . (11.30.20 @ 11:24)    COVID-19 PCR: Detected: Testing is performed using polymerase chain reaction (PCR) or  transcription mediated amplification (TMA). This COVID-19 (SARS-CoV-2)  nucleic acid amplification test was validated by [facility] and is  in use under the FDA Emergency Use Authorization (EUA) for clinical labs  CLIA-certified to perform high complexity testing. Test results should be  correlated with clinical presentation, patient history, and epidemiology.          Radiology: all available radiological tests reviewed  < from: CT Angio Chest PE Protocol w/ IV Cont (11.30.20 @ 13:20) >    EXAM:  CTA CHEST PE PROTOCOL (W)AW IC                            PROCEDURE DATE:  11/30/2020          INTERPRETATION:  CLINICAL INFORMATION: Shortness of breath, elevated d-dimer    COMPARISON: None.    PROCEDURE:  CT Angiography of the Chest.  90 ml of Omnipaque 350 was injected intravenously. 10 ml were discarded.  Sagittal and coronal reformats were performed as well as 3D (MIP) reconstructions.    FINDINGS:    LUNGS AND AIRWAYS: Patent central airways.  Scattered nonspecific groundglass opacities with areas of interlobular septal thickening may represent air trapping versus pulmonary edema versus chronic fibrosis versus unclear etiology. Minimal bibasilar atelectasis. No segmental consolidations.  PLEURA: Trace right pleural effusion.  MEDIASTINUM AND GLENROY: 2.2 cm right hilar lymph node. Subcarinal lymph node measuring 1.8 cm. Additional subcentimeter right hilar lymph nodes. Hiatal hernia containing intrathoracic component of proximal stomach.  VESSELS: Atherosclerotic changes of the aorta and coronary vasculature. No aortic aneurysm. No evidence of acute pulmonary embolism.  HEART: Heart size is normal. No pericardial effusion.  CHEST WALL AND LOWER NECK: Within normal limits.  VISUALIZED UPPER ABDOMEN: 1.2 cm right adrenal nodule, indeterminate in nature on current study. Subtle hypovascular lesion in the left hepatic lobe indeterminate in nature. Hepatic steatosis. Question gallstones. MRI of the abdomen is recommended for further evaluation.  BONES: Degenerative changes. Small sclerotic density in the posterior medial left 11th rib.    IMPRESSION:  No evidence of acute pulmonary embolism.    Indeterminate right hilar and subcarinal adenopathy. Scattered groundglass opacities with intralobular septal thickening may represent pulmonary edema versus air trapping versus mild chronic fibrosis, correlate clinically. No segmental consolidations. Minimal bibasilar atelectasis.    Indeterminate right adrenal nodule. Indeterminate hypovascular lesion in the left hepatic lobe. MRI of the abdomen with and without contrast is recommended for further evaluation.    Hiatal hernia with an intrathoracic component of proximal stomach      < end of copied text >    Advanced directives addressed: full resuscitation

## 2020-12-03 NOTE — PROGRESS NOTE ADULT - SUBJECTIVE AND OBJECTIVE BOX
CC:  Patient is a 70y old  Female who presents with a chief complaint of sob (02 Dec 2020 16:18)    SUBJECTIVE: RN at bedside.    - O2 via NC @ 2LPM.  - c/o constipation.    ROS:  all other review of systems are negative unless indicated above.    acetaminophen   Tablet .. 650 milliGRAM(s) Oral every 4 hours PRN  ALBUTerol    90 MICROgram(s) HFA Inhaler 2 Puff(s) Inhalation every 6 hours PRN  dexAMETHasone  Injectable 6 milliGRAM(s) IV Push daily  enoxaparin Injectable 40 milliGRAM(s) SubCutaneous every 12 hours  guaifenesin/dextromethorphan  Syrup 10 milliLiter(s) Oral every 4 hours PRN  losartan 25 milliGRAM(s) Oral daily  ondansetron Injectable 4 milliGRAM(s) IV Push every 6 hours PRN  pantoprazole    Tablet 40 milliGRAM(s) Oral daily  polyethylene glycol 3350 17 Gram(s) Oral daily PRN  remdesivir  IVPB   IV Intermittent   remdesivir  IVPB 100 milliGRAM(s) IV Intermittent every 24 hours  senna 2 Tablet(s) Oral at bedtime PRN  simvastatin 40 milliGRAM(s) Oral at bedtime    T(C): 36.6 (12-03-20 @ 09:28), Max: 36.8 (12-02-20 @ 21:40)  HR: 71 (12-03-20 @ 09:28) (71 - 79)  BP: 124/79 (12-03-20 @ 09:28) (111/60 - 124/79)  RR: 17 (12-03-20 @ 09:28) (16 - 17)  SpO2: 98% (12-03-20 @ 09:28) (95% - 98%)    Constitutional: NAD.   HEENT: PERRL, EOMI, MMM.  Neck: Soft and supple, No carotid bruit, No JVD  Respiratory: Breath sounds are clear bilaterally, No wheezing, rales or rhonchi  Cardiovascular: S1 and S2, regular rate and rhythm, no murmur, rub or gallop.  Gastrointestinal: Bowel Sounds present, soft, nontender, nondistended, no guarding, no rebound, no mass.  Extremities: No peripheral edema  Vascular: 2+ peripheral pulses  Neurological: A/O x , no focal deficits  Musculoskeletal: 5/5 strength b/l upper and lower extremities  Skin:  no visible rashes.       12-03    x   |  x   |  x   ----------------------------<  x   x    |  x   |  0.72      TPro  6.4  /  Alb  2.8<L>  /  TBili  0.3  /  DBili  <0.1  /  AST  20  /  ALT  23  /  AlkPhos  73  12-03

## 2020-12-03 NOTE — PROGRESS NOTE ADULT - ASSESSMENT
COVID-19.  - trend inflammatory markers.  - CXR.  - supplemental O2 via NC.  - TONEY.  - dexamethasone.  - remdesivir.  - monitor off ABx at this time.  - DVT prophylaxis:  LMWH.    R adrenal nodule.  L hepatic lobe lesion.  - incidental indeterminate findings on CT imaging.  - MR of AB w/wo IVC recommended.

## 2020-12-03 NOTE — PROGRESS NOTE ADULT - ASSESSMENT
69 yo F pmh HTN, hyperlipidemia presents on 11/30 for evaluation of shortness of breath and purple lips, myalgia; upon admission was hypoxic to 89%; the patient had COVID-19 exposure on 11/26; subsequent outpatient COVID-19 testing was positive, however her symptoms progressed over the weekend and she came to ED on 11/30. No GI symptoms, no travel.     1. Patient admitted with viral syndrome associated with COVID-19 infection  - follow up cultures   - oxygen and nebs as needed   - serial cbc and monitor temperature   - reviewed prior medical records to evaluate for resistant or atypical pathogens   - continue isolation  - agree with steroids as ordered  - will start remdesivir; discussed side effects with patient and she is agreeable to medication, day #3  - tolerating remdesivir without rashes or side effects   - monitor renal and liver function while on remdesivir  - monitor off antibiotics at this time  - will not give convalescent plasma given that patient has been COVID-19 positive for greater than 6 days  2. other issues: per medicine

## 2020-12-03 NOTE — DISCHARGE NOTE NURSING/CASE MANAGEMENT/SOCIAL WORK - PATIENT PORTAL LINK FT
You can access the FollowMyHealth Patient Portal offered by [facility] by registering at the following website: [email or web address]. By joining [facility]’s FollowMyHealth portal, you will also be able to view your health information using other applications (apps) compatible with our system.

## 2020-12-04 LAB
ALBUMIN SERPL ELPH-MCNC: 2.7 G/DL — LOW (ref 3.3–5)
ALP SERPL-CCNC: 68 U/L — SIGNIFICANT CHANGE UP (ref 40–120)
ALT FLD-CCNC: 29 U/L — SIGNIFICANT CHANGE UP (ref 12–78)
AST SERPL-CCNC: 29 U/L — SIGNIFICANT CHANGE UP (ref 15–37)
BILIRUB DIRECT SERPL-MCNC: <0.1 MG/DL — SIGNIFICANT CHANGE UP (ref 0–0.2)
BILIRUB INDIRECT FLD-MCNC: >0.1 MG/DL — LOW (ref 0.2–1)
BILIRUB SERPL-MCNC: 0.2 MG/DL — SIGNIFICANT CHANGE UP (ref 0.2–1.2)
CREAT SERPL-MCNC: 0.84 MG/DL — SIGNIFICANT CHANGE UP (ref 0.5–1.3)
CRP SERPL-MCNC: 0.99 MG/DL — HIGH (ref 0–0.4)
D DIMER BLD IA.RAPID-MCNC: <150 NG/ML DDU — SIGNIFICANT CHANGE UP
FERRITIN SERPL-MCNC: 285 NG/ML — HIGH (ref 15–150)
PROT SERPL-MCNC: 6.3 GM/DL — SIGNIFICANT CHANGE UP (ref 6–8.3)

## 2020-12-04 PROCEDURE — 99232 SBSQ HOSP IP/OBS MODERATE 35: CPT

## 2020-12-04 PROCEDURE — 71045 X-RAY EXAM CHEST 1 VIEW: CPT | Mod: 26

## 2020-12-04 RX ADMIN — ENOXAPARIN SODIUM 40 MILLIGRAM(S): 100 INJECTION SUBCUTANEOUS at 22:13

## 2020-12-04 RX ADMIN — Medication 6 MILLIGRAM(S): at 06:25

## 2020-12-04 RX ADMIN — Medication 650 MILLIGRAM(S): at 23:15

## 2020-12-04 RX ADMIN — ENOXAPARIN SODIUM 40 MILLIGRAM(S): 100 INJECTION SUBCUTANEOUS at 09:41

## 2020-12-04 RX ADMIN — SIMVASTATIN 40 MILLIGRAM(S): 20 TABLET, FILM COATED ORAL at 22:13

## 2020-12-04 RX ADMIN — LOSARTAN POTASSIUM 25 MILLIGRAM(S): 100 TABLET, FILM COATED ORAL at 10:23

## 2020-12-04 RX ADMIN — Medication 650 MILLIGRAM(S): at 22:14

## 2020-12-04 RX ADMIN — PANTOPRAZOLE SODIUM 40 MILLIGRAM(S): 20 TABLET, DELAYED RELEASE ORAL at 09:41

## 2020-12-04 NOTE — PROGRESS NOTE ADULT - ASSESSMENT
71 yo F pmh HTN, hyperlipidemia presents on 11/30 for evaluation of shortness of breath and purple lips, myalgia; upon admission was hypoxic to 89%; the patient had COVID-19 exposure on 11/26; subsequent outpatient COVID-19 testing was positive, however her symptoms progressed over the weekend and she came to ED on 11/30. No GI symptoms, no travel.     1. Patient admitted with viral syndrome associated with COVID-19 infection  - follow up cultures   - oxygen and nebs as needed   - serial cbc and monitor temperature   - reviewed prior medical records to evaluate for resistant or atypical pathogens   - continue isolation  - agree with steroids as ordered  - will start remdesivir; discussed side effects with patient and she is agreeable to medication, day #4  - tolerating remdesivir without rashes or side effects   - monitor renal and liver function while on remdesivir  - monitor off antibiotics at this time  - will not give convalescent plasma given that patient has been COVID-19 positive for greater than 6 days  2. other issues: per medicine

## 2020-12-04 NOTE — PROGRESS NOTE ADULT - SUBJECTIVE AND OBJECTIVE BOX
CC:  Patient is a 70y old  Female who presents with a chief complaint of sob (03 Dec 2020 16:20)    SUBJECTIVE:     - breathing comfortably on RA.    ROS:  all other review of systems are negative unless indicated above.    acetaminophen   Tablet .. 650 milliGRAM(s) Oral every 4 hours PRN  ALBUTerol    90 MICROgram(s) HFA Inhaler 2 Puff(s) Inhalation every 6 hours PRN  dexAMETHasone  Injectable 6 milliGRAM(s) IV Push daily  enoxaparin Injectable 40 milliGRAM(s) SubCutaneous every 12 hours  guaifenesin/dextromethorphan  Syrup 10 milliLiter(s) Oral every 4 hours PRN  losartan 25 milliGRAM(s) Oral daily  ondansetron Injectable 4 milliGRAM(s) IV Push every 6 hours PRN  pantoprazole    Tablet 40 milliGRAM(s) Oral daily  polyethylene glycol 3350 17 Gram(s) Oral daily PRN  remdesivir  IVPB 100 milliGRAM(s) IV Intermittent every 24 hours  remdesivir  IVPB   IV Intermittent   senna 2 Tablet(s) Oral at bedtime PRN  simvastatin 40 milliGRAM(s) Oral at bedtime    T(C): 36.2 (12-04-20 @ 08:15), Max: 36.4 (12-03-20 @ 22:50)  HR: 69 (12-04-20 @ 08:15) (69 - 80)  BP: 120/59 (12-04-20 @ 08:15) (104/76 - 120/59)  RR: 16 (12-04-20 @ 08:15) (16 - 18)  SpO2: 98% (12-04-20 @ 08:15) (95% - 98%)    Constitutional: NAD.   HEENT: PERRL, EOMI, MMM.  Neck: Soft and supple, No carotid bruit, No JVD  Respiratory: Breath sounds are clear bilaterally, No wheezing, rales or rhonchi  Cardiovascular: S1 and S2, regular rate and rhythm, no murmur, rub or gallop.  Gastrointestinal: Bowel Sounds present, soft, nontender, nondistended, no guarding, no rebound, no mass.  Extremities: No peripheral edema  Vascular: 2+ peripheral pulses  Neurological: A/O x , no focal deficits  Musculoskeletal: 5/5 strength b/l upper and lower extremities  Skin:  no visible rashes.       12-04    x   |  x   |  x   ----------------------------<  x   x    |  x   |  0.84      TPro  6.3  /  Alb  2.7<L>  /  TBili  0.2  /  DBili  <0.1  /  AST  29  /  ALT  29  /  AlkPhos  68  12-04

## 2020-12-04 NOTE — PROGRESS NOTE ADULT - ASSESSMENT
COVID-19.  - inflammatory markers:  normalizing.  - CXR (preliminary):  no acute changes.  - supplemental O2 via NC.  - TONEY.  - dexamethasone.  - remdesivir.  - monitor off ABx at this time.  - DVT prophylaxis:  LMWH.    R adrenal nodule.  L hepatic lobe lesion.  - incidental indeterminate findings on CT imaging.  - MR of AB w/wo IVC recommended.    disposition.  - 5E.  - PT.  - DC home likely in AM.    communication.  - RN.  - 12/04:  patient's son.

## 2020-12-04 NOTE — PROGRESS NOTE ADULT - SUBJECTIVE AND OBJECTIVE BOX
Date of service: 12-04-20 @ 15:32      Patient lying in bed; overall breathing better, still on oxygen      ROS: no fever or chills; denies dizziness, no HA,  no abdominal pain, no diarrhea or constipation; no dysuria, no urinary frequency, no legs pain, no rashes    MEDICATIONS  (STANDING):  dexAMETHasone  Injectable 6 milliGRAM(s) IV Push daily  enoxaparin Injectable 40 milliGRAM(s) SubCutaneous every 12 hours  losartan 25 milliGRAM(s) Oral daily  pantoprazole    Tablet 40 milliGRAM(s) Oral daily  remdesivir  IVPB   IV Intermittent   remdesivir  IVPB 100 milliGRAM(s) IV Intermittent every 24 hours  simvastatin 40 milliGRAM(s) Oral at bedtime    MEDICATIONS  (PRN):  acetaminophen   Tablet .. 650 milliGRAM(s) Oral every 4 hours PRN Temp greater or equal to 38.5C (101.3F)  ALBUTerol    90 MICROgram(s) HFA Inhaler 2 Puff(s) Inhalation every 6 hours PRN Bronchospasm  guaifenesin/dextromethorphan  Syrup 10 milliLiter(s) Oral every 4 hours PRN Cough  ondansetron Injectable 4 milliGRAM(s) IV Push every 6 hours PRN Nausea and/or Vomiting  polyethylene glycol 3350 17 Gram(s) Oral daily PRN Constipation  senna 2 Tablet(s) Oral at bedtime PRN Constipation      Vital Signs Last 24 Hrs  T(C): 36.6 (04 Dec 2020 15:22), Max: 36.6 (04 Dec 2020 15:22)  T(F): 97.9 (04 Dec 2020 15:22), Max: 97.9 (04 Dec 2020 15:22)  HR: 86 (04 Dec 2020 15:22) (69 - 86)  BP: 97/50 (04 Dec 2020 15:22) (97/50 - 120/59)  BP(mean): --  RR: 18 (04 Dec 2020 15:22) (16 - 18)  SpO2: 96% (04 Dec 2020 15:22) (96% - 98%)        Physical Exam:          Constitutional: frail looking  HEENT: NC/AT, EOMI, PERRLA, conjunctivae clear; ears and nose atraumatic; pharynx clear  Neck: supple; thyroid not palpable  Back: no tenderness  Respiratory: respiratory effort normal; clear to auscultation with scattered coarse breath sounds  Cardiovascular: S1S2 regular, no murmurs  Abdomen: soft, not tender, not distended, positive BS; no liver or spleen organomegaly  Genitourinary: no suprapubic tenderness  Musculoskeletal: no muscle tenderness, no joint swelling or tenderness  Neurological/ Psychiatric: AxOx3, judgement and insight normal;  moving all extremities  Skin: no rashes; no palpable lesions    Labs: all available labs reviewed             Labs:    12-04    x   |  x   |  x   ----------------------------<  x   x    |  x   |  0.84      TPro  6.3  /  Alb  2.7<L>  /  TBili  0.2  /  DBili  <0.1  /  AST  29  /  ALT  29  /  AlkPhos  68  12-04           Cultures:       Culture - Blood (collected 11-30-20 @ 11:24)  Source: .Blood None  Preliminary Report (12-01-20 @ 15:02):    No growth to date.      D-Dimer Assay, Quantitative: <150 ng/mL DDU (12-04-20 @ 08:02)  Ferritin, Serum: 285 ng/mL (12-04-20 @ 08:02)  C-Reactive Protein, Serum: 0.99 mg/dL (12-04-20 @ 08:02)  Ferritin, Serum: 299 ng/mL (11-30-20 @ 16:48)  C-Reactive Protein, Serum: 3.12 mg/dL (11-30-20 @ 11:24)  D-Dimer Assay, Quantitative: 453 ng/mL DDU (11-30-20 @ 11:24)  Ferritin, Serum: 263 ng/mL (11-30-20 @ 11:24)        COVID-19 PCR . (11.30.20 @ 11:24)    COVID-19 PCR: Detected: Testing is performed using polymerase chain reaction (PCR) or  transcription mediated amplification (TMA). This COVID-19 (SARS-CoV-2)  nucleic acid amplification test was validated by [employer] and is  in use under the FDA Emergency Use Authorization (EUA) for clinical labs  CLIA-certified to perform high complexity testing. Test results should be  correlated with clinical presentation, patient history, and epidemiology.          Radiology: all available radiological tests reviewed  < from: CT Angio Chest PE Protocol w/ IV Cont (11.30.20 @ 13:20) >    EXAM:  CTA CHEST PE PROTOCOL (W)AW IC                            PROCEDURE DATE:  11/30/2020          INTERPRETATION:  CLINICAL INFORMATION: Shortness of breath, elevated d-dimer    COMPARISON: None.    PROCEDURE:  CT Angiography of the Chest.  90 ml of Omnipaque 350 was injected intravenously. 10 ml were discarded.  Sagittal and coronal reformats were performed as well as 3D (MIP) reconstructions.    FINDINGS:    LUNGS AND AIRWAYS: Patent central airways.  Scattered nonspecific groundglass opacities with areas of interlobular septal thickening may represent air trapping versus pulmonary edema versus chronic fibrosis versus unclear etiology. Minimal bibasilar atelectasis. No segmental consolidations.  PLEURA: Trace right pleural effusion.  MEDIASTINUM AND GLENROY: 2.2 cm right hilar lymph node. Subcarinal lymph node measuring 1.8 cm. Additional subcentimeter right hilar lymph nodes. Hiatal hernia containing intrathoracic component of proximal stomach.  VESSELS: Atherosclerotic changes of the aorta and coronary vasculature. No aortic aneurysm. No evidence of acute pulmonary embolism.  HEART: Heart size is normal. No pericardial effusion.  CHEST WALL AND LOWER NECK: Within normal limits.  VISUALIZED UPPER ABDOMEN: 1.2 cm right adrenal nodule, indeterminate in nature on current study. Subtle hypovascular lesion in the left hepatic lobe indeterminate in nature. Hepatic steatosis. Question gallstones. MRI of the abdomen is recommended for further evaluation.  BONES: Degenerative changes. Small sclerotic density in the posterior medial left 11th rib.    IMPRESSION:  No evidence of acute pulmonary embolism.    Indeterminate right hilar and subcarinal adenopathy. Scattered groundglass opacities with intralobular septal thickening may represent pulmonary edema versus air trapping versus mild chronic fibrosis, correlate clinically. No segmental consolidations. Minimal bibasilar atelectasis.    Indeterminate right adrenal nodule. Indeterminate hypovascular lesion in the left hepatic lobe. MRI of the abdomen with and without contrast is recommended for further evaluation.    Hiatal hernia with an intrathoracic component of proximal stomach      < end of copied text >    Advanced directives addressed: full resuscitation

## 2020-12-05 LAB
ALBUMIN SERPL ELPH-MCNC: 2.6 G/DL — LOW (ref 3.3–5)
ALP SERPL-CCNC: 67 U/L — SIGNIFICANT CHANGE UP (ref 40–120)
ALT FLD-CCNC: 29 U/L — SIGNIFICANT CHANGE UP (ref 12–78)
AST SERPL-CCNC: 18 U/L — SIGNIFICANT CHANGE UP (ref 15–37)
BILIRUB DIRECT SERPL-MCNC: 0.1 MG/DL — SIGNIFICANT CHANGE UP (ref 0–0.2)
BILIRUB INDIRECT FLD-MCNC: 0.1 MG/DL — LOW (ref 0.2–1)
BILIRUB SERPL-MCNC: 0.2 MG/DL — SIGNIFICANT CHANGE UP (ref 0.2–1.2)
CREAT SERPL-MCNC: 0.75 MG/DL — SIGNIFICANT CHANGE UP (ref 0.5–1.3)
CULTURE RESULTS: SIGNIFICANT CHANGE UP
PROT SERPL-MCNC: 6.1 GM/DL — SIGNIFICANT CHANGE UP (ref 6–8.3)
SPECIMEN SOURCE: SIGNIFICANT CHANGE UP

## 2020-12-05 PROCEDURE — 99232 SBSQ HOSP IP/OBS MODERATE 35: CPT

## 2020-12-05 RX ORDER — ACETAMINOPHEN 500 MG
650 TABLET ORAL EVERY 6 HOURS
Refills: 0 | Status: DISCONTINUED | OUTPATIENT
Start: 2020-12-05 | End: 2020-12-07

## 2020-12-05 RX ADMIN — SIMVASTATIN 40 MILLIGRAM(S): 20 TABLET, FILM COATED ORAL at 21:42

## 2020-12-05 RX ADMIN — LOSARTAN POTASSIUM 25 MILLIGRAM(S): 100 TABLET, FILM COATED ORAL at 09:41

## 2020-12-05 RX ADMIN — SENNA PLUS 2 TABLET(S): 8.6 TABLET ORAL at 21:43

## 2020-12-05 RX ADMIN — Medication 6 MILLIGRAM(S): at 05:40

## 2020-12-05 RX ADMIN — Medication 650 MILLIGRAM(S): at 21:44

## 2020-12-05 RX ADMIN — ENOXAPARIN SODIUM 40 MILLIGRAM(S): 100 INJECTION SUBCUTANEOUS at 09:41

## 2020-12-05 RX ADMIN — PANTOPRAZOLE SODIUM 40 MILLIGRAM(S): 20 TABLET, DELAYED RELEASE ORAL at 09:41

## 2020-12-05 RX ADMIN — POLYETHYLENE GLYCOL 3350 17 GRAM(S): 17 POWDER, FOR SOLUTION ORAL at 10:30

## 2020-12-05 RX ADMIN — ENOXAPARIN SODIUM 40 MILLIGRAM(S): 100 INJECTION SUBCUTANEOUS at 21:42

## 2020-12-05 RX ADMIN — Medication 650 MILLIGRAM(S): at 22:45

## 2020-12-05 NOTE — CHART NOTE - NSCHARTNOTEFT_GEN_A_CORE
HOME O2 EVALUATION    Pulse Ox Room Air Rest:97    Pulse Ox Room Air Ambulatin  Pulse Ox on O2          L Ambulating:    Pulse Ox Post Ambulation:

## 2020-12-05 NOTE — PHYSICAL THERAPY INITIAL EVALUATION ADULT - PERTINENT HX OF CURRENT PROBLEM, REHAB EVAL
Pt presents on 11/30 for evaluation of shortness of breath and purple lips, myalgia; upon admission was hypoxic to 89%; the patient had COVID-19 exposure on 11/26; subsequent outpatient COVID-19 testing was positive, however her symptoms progressed over the weekend and she came to ED on 11/30. Patient admitted with viral syndrome associated with COVID-19 infection,

## 2020-12-05 NOTE — PROGRESS NOTE ADULT - SUBJECTIVE AND OBJECTIVE BOX
CC:  Patient is a 70y old  Female who presents with a chief complaint of sob (04 Dec 2020 15:31)    SUBJECTIVE:     -no new complaints or issues at current time.    ROS:  all other review of systems are negative unless indicated above.    acetaminophen   Tablet .. 650 milliGRAM(s) Oral every 4 hours PRN  ALBUTerol    90 MICROgram(s) HFA Inhaler 2 Puff(s) Inhalation every 6 hours PRN  dexAMETHasone  Injectable 6 milliGRAM(s) IV Push daily  enoxaparin Injectable 40 milliGRAM(s) SubCutaneous every 12 hours  guaifenesin/dextromethorphan  Syrup 10 milliLiter(s) Oral every 4 hours PRN  losartan 25 milliGRAM(s) Oral daily  ondansetron Injectable 4 milliGRAM(s) IV Push every 6 hours PRN  pantoprazole    Tablet 40 milliGRAM(s) Oral daily  polyethylene glycol 3350 17 Gram(s) Oral daily PRN  senna 2 Tablet(s) Oral at bedtime PRN  simvastatin 40 milliGRAM(s) Oral at bedtime    T(C): 36.9 (12-05-20 @ 15:32), Max: 36.9 (12-05-20 @ 15:32)  HR: 78 (12-05-20 @ 15:32) (73 - 78)  BP: 128/77 (12-05-20 @ 15:32) (110/71 - 144/83)  RR: 18 (12-05-20 @ 15:32) (17 - 19)  SpO2: 94% (12-05-20 @ 15:32) (94% - 98%)    Constitutional: NAD.   HEENT: PERRL, EOMI, MMM.  Neck: Soft and supple, No carotid bruit, No JVD  Respiratory: Breath sounds are clear bilaterally, No wheezing, rales or rhonchi  Cardiovascular: S1 and S2, regular rate and rhythm, no murmur, rub or gallop.  Gastrointestinal: Bowel Sounds present, soft, nontender, nondistended, no guarding, no rebound, no mass.  Extremities: No peripheral edema  Vascular: 2+ peripheral pulses  Neurological: A/O x , no focal deficits  Musculoskeletal: 5/5 strength b/l upper and lower extremities  Skin:  no visible rashes.       12-05    x   |  x   |  x   ----------------------------<  x   x    |  x   |  0.75      TPro  6.1  /  Alb  2.6<L>  /  TBili  0.2  /  DBili  0.1  /  AST  18  /  ALT  29  /  AlkPhos  67  12-05    < from: Xray Chest 1 View- PORTABLE-Routine (Xray Chest 1 View- PORTABLE-Routine in AM.) (12.04.20 @ 10:27) >  IMPRESSION:   No evidence of active chest disease.    < end of copied text >

## 2020-12-05 NOTE — PROGRESS NOTE ADULT - SUBJECTIVE AND OBJECTIVE BOX
Date of service: 12-05-20 @ 16:55      Patient is off oxygen; lying in bed; anxious about going home    ROS unable to obtain secondary to patient medical condition     MEDICATIONS  (STANDING):  dexAMETHasone  Injectable 6 milliGRAM(s) IV Push daily  enoxaparin Injectable 40 milliGRAM(s) SubCutaneous every 12 hours  losartan 25 milliGRAM(s) Oral daily  pantoprazole    Tablet 40 milliGRAM(s) Oral daily  simvastatin 40 milliGRAM(s) Oral at bedtime    MEDICATIONS  (PRN):  acetaminophen   Tablet .. 650 milliGRAM(s) Oral every 4 hours PRN Temp greater or equal to 38.5C (101.3F)  ALBUTerol    90 MICROgram(s) HFA Inhaler 2 Puff(s) Inhalation every 6 hours PRN Bronchospasm  guaifenesin/dextromethorphan  Syrup 10 milliLiter(s) Oral every 4 hours PRN Cough  ondansetron Injectable 4 milliGRAM(s) IV Push every 6 hours PRN Nausea and/or Vomiting  polyethylene glycol 3350 17 Gram(s) Oral daily PRN Constipation  senna 2 Tablet(s) Oral at bedtime PRN Constipation      Vital Signs Last 24 Hrs  T(C): 36.9 (05 Dec 2020 15:32), Max: 36.9 (05 Dec 2020 15:32)  T(F): 98.5 (05 Dec 2020 15:32), Max: 98.5 (05 Dec 2020 15:32)  HR: 78 (05 Dec 2020 15:32) (73 - 78)  BP: 128/77 (05 Dec 2020 15:32) (110/71 - 144/83)  BP(mean): --  RR: 18 (05 Dec 2020 15:32) (17 - 19)  SpO2: 94% (05 Dec 2020 15:32) (94% - 98%)        Physical Exam:          Constitutional: frail looking  HEENT: NC/AT, EOMI, PERRLA, conjunctivae clear; ears and nose atraumatic; pharynx clear  Neck: supple; thyroid not palpable  Back: no tenderness  Respiratory: respiratory effort normal; clear to auscultation with scattered coarse breath sounds  Cardiovascular: S1S2 regular, no murmurs  Abdomen: soft, not tender, not distended, positive BS; no liver or spleen organomegaly  Genitourinary: no suprapubic tenderness  Musculoskeletal: no muscle tenderness, no joint swelling or tenderness  Neurological/ Psychiatric: AxOx3, judgement and insight normal;  moving all extremities  Skin: no rashes; no palpable lesions    Labs: all available labs reviewed     Labs:    12-05    x   |  x   |  x   ----------------------------<  x   x    |  x   |  0.75      TPro  6.1  /  Alb  2.6<L>  /  TBili  0.2  /  DBili  0.1  /  AST  18  /  ALT  29  /  AlkPhos  67  12-05           Cultures:       Culture - Blood (collected 11-30-20 @ 11:24)  Source: .Blood None  Final Report (12-05-20 @ 15:03):    No Growth Final      D-Dimer Assay, Quantitative: <150 ng/mL DDU (12-04-20 @ 08:02)  Ferritin, Serum: 285 ng/mL (12-04-20 @ 08:02)  C-Reactive Protein, Serum: 0.99 mg/dL (12-04-20 @ 08:02)  Ferritin, Serum: 299 ng/mL (11-30-20 @ 16:48)  C-Reactive Protein, Serum: 3.12 mg/dL (11-30-20 @ 11:24)  D-Dimer Assay, Quantitative: 453 ng/mL DDU (11-30-20 @ 11:24)  Ferritin, Serum: 263 ng/mL (11-30-20 @ 11:24)      COVID-19 PCR . (11.30.20 @ 11:24)    COVID-19 PCR: Detected: Testing is performed using polymerase chain reaction (PCR) or  transcription mediated amplification (TMA). This COVID-19 (SARS-CoV-2)  nucleic acid amplification test was validated by [employer] [facility] [employer] and is  in use under the FDA Emergency Use Authorization (EUA) for clinical labs  CLIA-certified to perform high complexity testing. Test results should be  correlated with clinical presentation, patient history, and epidemiology.          Radiology: all available radiological tests reviewed  < from: CT Angio Chest PE Protocol w/ IV Cont (11.30.20 @ 13:20) >    EXAM:  CTA CHEST PE PROTOCOL (W)AW IC                            PROCEDURE DATE:  11/30/2020          INTERPRETATION:  CLINICAL INFORMATION: Shortness of breath, elevated d-dimer    COMPARISON: None.    PROCEDURE:  CT Angiography of the Chest.  90 ml of Omnipaque 350 was injected intravenously. 10 ml were discarded.  Sagittal and coronal reformats were performed as well as 3D (MIP) reconstructions.    FINDINGS:    LUNGS AND AIRWAYS: Patent central airways.  Scattered nonspecific groundglass opacities with areas of interlobular septal thickening may represent air trapping versus pulmonary edema versus chronic fibrosis versus unclear etiology. Minimal bibasilar atelectasis. No segmental consolidations.  PLEURA: Trace right pleural effusion.  MEDIASTINUM AND GLENROY: 2.2 cm right hilar lymph node. Subcarinal lymph node measuring 1.8 cm. Additional subcentimeter right hilar lymph nodes. Hiatal hernia containing intrathoracic component of proximal stomach.  VESSELS: Atherosclerotic changes of the aorta and coronary vasculature. No aortic aneurysm. No evidence of acute pulmonary embolism.  HEART: Heart size is normal. No pericardial effusion.  CHEST WALL AND LOWER NECK: Within normal limits.  VISUALIZED UPPER ABDOMEN: 1.2 cm right adrenal nodule, indeterminate in nature on current study. Subtle hypovascular lesion in the left hepatic lobe indeterminate in nature. Hepatic steatosis. Question gallstones. MRI of the abdomen is recommended for further evaluation.  BONES: Degenerative changes. Small sclerotic density in the posterior medial left 11th rib.    IMPRESSION:  No evidence of acute pulmonary embolism.    Indeterminate right hilar and subcarinal adenopathy. Scattered groundglass opacities with intralobular septal thickening may represent pulmonary edema versus air trapping versus mild chronic fibrosis, correlate clinically. No segmental consolidations. Minimal bibasilar atelectasis.    Indeterminate right adrenal nodule. Indeterminate hypovascular lesion in the left hepatic lobe. MRI of the abdomen with and without contrast is recommended for further evaluation.    Hiatal hernia with an intrathoracic component of proximal stomach      < end of copied text >    Advanced directives addressed: full resuscitation

## 2020-12-05 NOTE — PROGRESS NOTE ADULT - ASSESSMENT
71 yo F pmh HTN, hyperlipidemia presents on 11/30 for evaluation of shortness of breath and purple lips, myalgia; upon admission was hypoxic to 89%; the patient had COVID-19 exposure on 11/26; subsequent outpatient COVID-19 testing was positive, however her symptoms progressed over the weekend and she came to ED on 11/30. No GI symptoms, no travel.     1. Patient admitted with viral syndrome associated with COVID-19 infection  - follow up cultures   - oxygen and nebs as needed   - serial cbc and monitor temperature   - reviewed prior medical records to evaluate for resistant or atypical pathogens   - continue isolation  - agree with steroids as ordered  - will start remdesivir; discussed side effects with patient and she is agreeable to medication, day #5  - remdesivir is complete; patient to be monitored off antibiotics, discharge planning  - tolerating remdesivir without rashes or side effects   - monitor renal and liver function while on remdesivir  - monitor off antibiotics at this time  - will not give convalescent plasma given that patient has been COVID-19 positive for greater than 6 days  2. other issues: per medicine

## 2020-12-06 LAB
ALBUMIN SERPL ELPH-MCNC: 2.7 G/DL — LOW (ref 3.3–5)
ALP SERPL-CCNC: 64 U/L — SIGNIFICANT CHANGE UP (ref 40–120)
ALT FLD-CCNC: 27 U/L — SIGNIFICANT CHANGE UP (ref 12–78)
AST SERPL-CCNC: 12 U/L — LOW (ref 15–37)
BILIRUB DIRECT SERPL-MCNC: <0.1 MG/DL — SIGNIFICANT CHANGE UP (ref 0–0.2)
BILIRUB INDIRECT FLD-MCNC: >0.1 MG/DL — LOW (ref 0.2–1)
BILIRUB SERPL-MCNC: 0.2 MG/DL — SIGNIFICANT CHANGE UP (ref 0.2–1.2)
CREAT SERPL-MCNC: 0.79 MG/DL — SIGNIFICANT CHANGE UP (ref 0.5–1.3)
PROT SERPL-MCNC: 6.2 GM/DL — SIGNIFICANT CHANGE UP (ref 6–8.3)

## 2020-12-06 PROCEDURE — 99232 SBSQ HOSP IP/OBS MODERATE 35: CPT

## 2020-12-06 RX ORDER — DIPHENHYDRAMINE HYDROCHLORIDE AND LIDOCAINE HYDROCHLORIDE AND ALUMINUM HYDROXIDE AND MAGNESIUM HYDRO
5 KIT EVERY 8 HOURS
Refills: 0 | Status: DISCONTINUED | OUTPATIENT
Start: 2020-12-06 | End: 2020-12-07

## 2020-12-06 RX ADMIN — ENOXAPARIN SODIUM 40 MILLIGRAM(S): 100 INJECTION SUBCUTANEOUS at 10:55

## 2020-12-06 RX ADMIN — DIPHENHYDRAMINE HYDROCHLORIDE AND LIDOCAINE HYDROCHLORIDE AND ALUMINUM HYDROXIDE AND MAGNESIUM HYDRO 5 MILLILITER(S): KIT at 20:01

## 2020-12-06 RX ADMIN — Medication 650 MILLIGRAM(S): at 23:25

## 2020-12-06 RX ADMIN — SENNA PLUS 2 TABLET(S): 8.6 TABLET ORAL at 22:25

## 2020-12-06 RX ADMIN — ENOXAPARIN SODIUM 40 MILLIGRAM(S): 100 INJECTION SUBCUTANEOUS at 22:25

## 2020-12-06 RX ADMIN — LOSARTAN POTASSIUM 25 MILLIGRAM(S): 100 TABLET, FILM COATED ORAL at 10:49

## 2020-12-06 RX ADMIN — PANTOPRAZOLE SODIUM 40 MILLIGRAM(S): 20 TABLET, DELAYED RELEASE ORAL at 10:49

## 2020-12-06 RX ADMIN — Medication 650 MILLIGRAM(S): at 22:25

## 2020-12-06 RX ADMIN — SIMVASTATIN 40 MILLIGRAM(S): 20 TABLET, FILM COATED ORAL at 22:25

## 2020-12-06 RX ADMIN — Medication 6 MILLIGRAM(S): at 05:18

## 2020-12-06 NOTE — PROGRESS NOTE ADULT - SUBJECTIVE AND OBJECTIVE BOX
CC:  Patient is a 70y old  Female who presents with a chief complaint of sob (06 Dec 2020 16:21)    SUBJECTIVE:     -no new complaints or issues at current time.    ROS:  all other review of systems are negative unless indicated above.    acetaminophen   Tablet .. 650 milliGRAM(s) Oral every 6 hours PRN  ALBUTerol    90 MICROgram(s) HFA Inhaler 2 Puff(s) Inhalation every 6 hours PRN  dexAMETHasone  Injectable 6 milliGRAM(s) IV Push daily  enoxaparin Injectable 40 milliGRAM(s) SubCutaneous every 12 hours  guaifenesin/dextromethorphan  Syrup 10 milliLiter(s) Oral every 4 hours PRN  losartan 25 milliGRAM(s) Oral daily  ondansetron Injectable 4 milliGRAM(s) IV Push every 6 hours PRN  pantoprazole    Tablet 40 milliGRAM(s) Oral daily  polyethylene glycol 3350 17 Gram(s) Oral daily PRN  senna 2 Tablet(s) Oral at bedtime PRN  simvastatin 40 milliGRAM(s) Oral at bedtime    T(C): 36 (12-06-20 @ 05:44), Max: 36.8 (12-05-20 @ 22:08)  HR: 78 (12-06-20 @ 09:00) (72 - 78)  BP: 133/87 (12-06-20 @ 09:00) (132/67 - 146/78)  RR: 20 (12-06-20 @ 05:44) (17 - 20)  SpO2: 95% (12-06-20 @ 09:00) (92% - 97%)    Constitutional: NAD.   HEENT: PERRL, EOMI, MMM.  Neck: Soft and supple, No carotid bruit, No JVD  Respiratory: Breath sounds are clear bilaterally, No wheezing, rales or rhonchi  Cardiovascular: S1 and S2, regular rate and rhythm, no murmur, rub or gallop.  Gastrointestinal: Bowel Sounds present, soft, nontender, nondistended, no guarding, no rebound, no mass.  Extremities: No peripheral edema  Vascular: 2+ peripheral pulses  Neurological: A/O x , no focal deficits  Musculoskeletal: 5/5 strength b/l upper and lower extremities  Skin:  no visible rashes.       12-06    x   |  x   |  x   ----------------------------<  x   x    |  x   |  0.79      TPro  6.2  /  Alb  2.7<L>  /  TBili  0.2  /  DBili  <0.1  /  AST  12<L>  /  ALT  27  /  AlkPhos  64  12-06

## 2020-12-06 NOTE — PROGRESS NOTE ADULT - ASSESSMENT
69 yo F pmh HTN, hyperlipidemia presents on 11/30 for evaluation of shortness of breath and purple lips, myalgia; upon admission was hypoxic to 89%; the patient had COVID-19 exposure on 11/26; subsequent outpatient COVID-19 testing was positive, however her symptoms progressed over the weekend and she came to ED on 11/30. No GI symptoms, no travel.     1. Patient admitted with viral syndrome associated with COVID-19 infection  - follow up cultures   - oxygen and nebs as needed   - serial cbc and monitor temperature   - reviewed prior medical records to evaluate for resistant or atypical pathogens   - continue isolation  - agree with steroids as ordered  - will start remdesivir; discussed side effects with patient and she is agreeable to medication, day #5  - remdesivir is complete; patient to be monitored off antibiotics, discharge planning  - tolerating remdesivir without rashes or side effects   - monitor renal and liver function while on remdesivir  - monitor off antibiotics at this time  - will not give convalescent plasma given that patient has been COVID-19 positive for greater than 6 days  - will stop dexamethasone  - discharge planning  2. other issues: per medicine

## 2020-12-06 NOTE — CHART NOTE - NSCHARTNOTEFT_GEN_A_CORE
HOME O2 EVALUATION    Pulse Ox Room Air Rest: 94%    Pulse Ox Room Air Ambulatin%    Pulse Ox on O2          L Ambulating:    Pulse Ox Post Ambulation: 96%

## 2020-12-06 NOTE — PROGRESS NOTE ADULT - SUBJECTIVE AND OBJECTIVE BOX
Date of service: 12-06-20 @ 16:22      Patient ambulating in room; not using oxygen and feeling well      ROS: no fever or chills; denies dizziness, no HA, no SOB or cough, no abdominal pain, no diarrhea or constipation; no dysuria, no urinary frequency, no legs pain, no rashes    MEDICATIONS  (STANDING):  dexAMETHasone  Injectable 6 milliGRAM(s) IV Push daily  enoxaparin Injectable 40 milliGRAM(s) SubCutaneous every 12 hours  losartan 25 milliGRAM(s) Oral daily  pantoprazole    Tablet 40 milliGRAM(s) Oral daily  simvastatin 40 milliGRAM(s) Oral at bedtime    MEDICATIONS  (PRN):  acetaminophen   Tablet .. 650 milliGRAM(s) Oral every 6 hours PRN Temp greater or equal to 38C (100.4F), Mild Pain (1 - 3)  ALBUTerol    90 MICROgram(s) HFA Inhaler 2 Puff(s) Inhalation every 6 hours PRN Bronchospasm  guaifenesin/dextromethorphan  Syrup 10 milliLiter(s) Oral every 4 hours PRN Cough  ondansetron Injectable 4 milliGRAM(s) IV Push every 6 hours PRN Nausea and/or Vomiting  polyethylene glycol 3350 17 Gram(s) Oral daily PRN Constipation  senna 2 Tablet(s) Oral at bedtime PRN Constipation      Vital Signs Last 24 Hrs  T(C): 36 (06 Dec 2020 05:44), Max: 36.8 (05 Dec 2020 22:08)  T(F): 96.8 (06 Dec 2020 05:44), Max: 98.3 (05 Dec 2020 22:08)  HR: 78 (06 Dec 2020 09:00) (72 - 78)  BP: 133/87 (06 Dec 2020 09:00) (132/67 - 146/78)  BP(mean): --  RR: 20 (06 Dec 2020 05:44) (17 - 20)  SpO2: 95% (06 Dec 2020 09:00) (92% - 97%)        Physical Exam:        Physical Exam:          Constitutional: frail looking  HEENT: NC/AT, EOMI, PERRLA, conjunctivae clear; ears and nose atraumatic; pharynx clear  Neck: supple; thyroid not palpable  Back: no tenderness  Respiratory: respiratory effort normal; clear to auscultation with scattered coarse breath sounds  Cardiovascular: S1S2 regular, no murmurs  Abdomen: soft, not tender, not distended, positive BS; no liver or spleen organomegaly  Genitourinary: no suprapubic tenderness  Musculoskeletal: no muscle tenderness, no joint swelling or tenderness  Neurological/ Psychiatric: AxOx3, judgement and insight normal;  moving all extremities  Skin: no rashes; no palpable lesions    Labs: all available labs reviewed     Labs:    12-06    x   |  x   |  x   ----------------------------<  x   x    |  x   |  0.79      TPro  6.2  /  Alb  2.7<L>  /  TBili  0.2  /  DBili  <0.1  /  AST  12<L>  /  ALT  27  /  AlkPhos  64  12-06           Cultures:       Culture - Blood (collected 11-30-20 @ 11:24)  Source: .Blood None  Final Report (12-05-20 @ 15:03):    No Growth Final      D-Dimer Assay, Quantitative: <150 ng/mL DDU (12-04-20 @ 08:02)  Ferritin, Serum: 285 ng/mL (12-04-20 @ 08:02)  C-Reactive Protein, Serum: 0.99 mg/dL (12-04-20 @ 08:02)  Ferritin, Serum: 299 ng/mL (11-30-20 @ 16:48)  C-Reactive Protein, Serum: 3.12 mg/dL (11-30-20 @ 11:24)  D-Dimer Assay, Quantitative: 453 ng/mL DDU (11-30-20 @ 11:24)  Ferritin, Serum: 263 ng/mL (11-30-20 @ 11:24)          COVID-19 PCR . (11.30.20 @ 11:24)    COVID-19 PCR: Detected: Testing is performed using polymerase chain reaction (PCR) or  transcription mediated amplification (TMA). This COVID-19 (SARS-CoV-2)  nucleic acid amplification test was validated by [employer] and is  in use under the FDA Emergency Use Authorization (EUA) for clinical labs  CLIA-certified to perform high complexity testing. Test results should be  correlated with clinical presentation, patient history, and epidemiology.          Radiology: all available radiological tests reviewed  < from: CT Angio Chest PE Protocol w/ IV Cont (11.30.20 @ 13:20) >    EXAM:  CTA CHEST PE PROTOCOL (W)AW IC                            PROCEDURE DATE:  11/30/2020          INTERPRETATION:  CLINICAL INFORMATION: Shortness of breath, elevated d-dimer    COMPARISON: None.    PROCEDURE:  CT Angiography of the Chest.  90 ml of Omnipaque 350 was injected intravenously. 10 ml were discarded.  Sagittal and coronal reformats were performed as well as 3D (MIP) reconstructions.    FINDINGS:    LUNGS AND AIRWAYS: Patent central airways.  Scattered nonspecific groundglass opacities with areas of interlobular septal thickening may represent air trapping versus pulmonary edema versus chronic fibrosis versus unclear etiology. Minimal bibasilar atelectasis. No segmental consolidations.  PLEURA: Trace right pleural effusion.  MEDIASTINUM AND GLENROY: 2.2 cm right hilar lymph node. Subcarinal lymph node measuring 1.8 cm. Additional subcentimeter right hilar lymph nodes. Hiatal hernia containing intrathoracic component of proximal stomach.  VESSELS: Atherosclerotic changes of the aorta and coronary vasculature. No aortic aneurysm. No evidence of acute pulmonary embolism.  HEART: Heart size is normal. No pericardial effusion.  CHEST WALL AND LOWER NECK: Within normal limits.  VISUALIZED UPPER ABDOMEN: 1.2 cm right adrenal nodule, indeterminate in nature on current study. Subtle hypovascular lesion in the left hepatic lobe indeterminate in nature. Hepatic steatosis. Question gallstones. MRI of the abdomen is recommended for further evaluation.  BONES: Degenerative changes. Small sclerotic density in the posterior medial left 11th rib.    IMPRESSION:  No evidence of acute pulmonary embolism.    Indeterminate right hilar and subcarinal adenopathy. Scattered groundglass opacities with intralobular septal thickening may represent pulmonary edema versus air trapping versus mild chronic fibrosis, correlate clinically. No segmental consolidations. Minimal bibasilar atelectasis.    Indeterminate right adrenal nodule. Indeterminate hypovascular lesion in the left hepatic lobe. MRI of the abdomen with and without contrast is recommended for further evaluation.    Hiatal hernia with an intrathoracic component of proximal stomach      < end of copied text >    Advanced directives addressed: full resuscitation

## 2020-12-06 NOTE — PROGRESS NOTE ADULT - ASSESSMENT
COVID-19.  - inflammatory markers:  normalizing.  - CXR (preliminary):  no acute changes.  - supplemental O2 via NC.  - TONEY.  - dexamethasone.  - remdesivir.  - monitor off ABx at this time.  - DVT prophylaxis:  LMWH.    R adrenal nodule.  L hepatic lobe lesion.  - incidental indeterminate findings on CT imaging.  - MR of AB w/wo IVC recommended.  - patient made aware of above.  prefers to have study inpatient.    disposition.  - 5E.  - PT.  - DC home tomorrow pending MR results.    communication.  - RN.  - 12/04:  patient's son.

## 2020-12-07 ENCOUNTER — TRANSCRIPTION ENCOUNTER (OUTPATIENT)
Age: 70
End: 2020-12-07

## 2020-12-07 VITALS
RESPIRATION RATE: 17 BRPM | OXYGEN SATURATION: 95 % | SYSTOLIC BLOOD PRESSURE: 121 MMHG | HEART RATE: 86 BPM | TEMPERATURE: 99 F | DIASTOLIC BLOOD PRESSURE: 53 MMHG

## 2020-12-07 LAB
ADD ON TEST-SPECIMEN IN LAB: SIGNIFICANT CHANGE UP
ALBUMIN SERPL ELPH-MCNC: 2.9 G/DL — LOW (ref 3.3–5)
ALP SERPL-CCNC: 64 U/L — SIGNIFICANT CHANGE UP (ref 40–120)
ALT FLD-CCNC: 27 U/L — SIGNIFICANT CHANGE UP (ref 12–78)
AST SERPL-CCNC: 13 U/L — LOW (ref 15–37)
BILIRUB DIRECT SERPL-MCNC: 0.1 MG/DL — SIGNIFICANT CHANGE UP (ref 0–0.2)
BILIRUB INDIRECT FLD-MCNC: 0.2 MG/DL — SIGNIFICANT CHANGE UP (ref 0.2–1)
BILIRUB SERPL-MCNC: 0.3 MG/DL — SIGNIFICANT CHANGE UP (ref 0.2–1.2)
CREAT SERPL-MCNC: 0.84 MG/DL — SIGNIFICANT CHANGE UP (ref 0.5–1.3)
D DIMER BLD IA.RAPID-MCNC: <150 NG/ML DDU — SIGNIFICANT CHANGE UP
PROT SERPL-MCNC: 6.5 GM/DL — SIGNIFICANT CHANGE UP (ref 6–8.3)

## 2020-12-07 PROCEDURE — 74183 MRI ABD W/O CNTR FLWD CNTR: CPT | Mod: 26

## 2020-12-07 PROCEDURE — 99239 HOSP IP/OBS DSCHRG MGMT >30: CPT

## 2020-12-07 RX ADMIN — DIPHENHYDRAMINE HYDROCHLORIDE AND LIDOCAINE HYDROCHLORIDE AND ALUMINUM HYDROXIDE AND MAGNESIUM HYDRO 5 MILLILITER(S): KIT at 11:45

## 2020-12-07 RX ADMIN — Medication 6 MILLIGRAM(S): at 06:23

## 2020-12-07 RX ADMIN — PANTOPRAZOLE SODIUM 40 MILLIGRAM(S): 20 TABLET, DELAYED RELEASE ORAL at 11:37

## 2020-12-07 RX ADMIN — LOSARTAN POTASSIUM 25 MILLIGRAM(S): 100 TABLET, FILM COATED ORAL at 11:37

## 2020-12-07 RX ADMIN — ENOXAPARIN SODIUM 40 MILLIGRAM(S): 100 INJECTION SUBCUTANEOUS at 11:37

## 2020-12-07 NOTE — DISCHARGE NOTE PROVIDER - NSDCMRMEDTOKEN_GEN_ALL_CORE_FT
Albuterol (Eqv-Proventil HFA) 90 mcg/inh inhalation aerosol: Use as directed as needed  Aspirin Enteric Coated 325 mg oral delayed release tablet: 1 tab(s) orally once a day  ***pt started taking yesterday***  hydroCHLOROthiazide 12.5 mg oral capsule: 1 cap(s) orally once a day  lansoprazole 30 mg oral delayed release capsule: 1 cap(s) orally once a day, As Needed  losartan 25 mg oral tablet: 1 tab(s) orally once a day  simvastatin 40 mg oral tablet: 1 tab(s) orally once a day (at bedtime)  Vitamin D3 2000 intl units (50 mcg) oral tablet: 1 tab(s) orally once a day

## 2020-12-07 NOTE — DISCHARGE NOTE PROVIDER - HOSPITAL COURSE
chief complaint;  SOB  HPI/Hospital course:    71 yo F pmh HTN, hyperlipidemia presents w/ sob. Endorses positive family contact .  Dyspnea worse on exertion and having associated myalgias. No fevers or chills. No loss of sense of smell or taste. No cough. No N/V/D.  ED course: CTPE negative for PE but consistent with COVID19 pneumonia.  Pt was hypoxic on arrival to 89% place on NC. Given decadron 6mg IV x 1.  Pt completed a treatment course of remdesivir and decadron.  She is afebrile and satting well on room air.  Pt found to have incidental right adrenal nodule for which she will have MRI for further evaluation.    REVIEW OF SYSTEMS: All other review of systems is negative unless indicated above.    Vital Signs Last 24 Hrs  T(C): 36.6 (07 Dec 2020 08:24), Max: 36.9 (06 Dec 2020 17:10)  T(F): 97.8 (07 Dec 2020 08:24), Max: 98.4 (06 Dec 2020 17:10)  HR: 80 (07 Dec 2020 08:24) (80 - 85)  BP: 130/80 (07 Dec 2020 08:24) (129/77 - 144/74)  BP(mean): --  RR: 19 (06 Dec 2020 22:43) (17 - 19)  SpO2: 95% (07 Dec 2020 08:24) (95% - 96%)    PHYSICAL EXAM:    Constitutional: NAD, awake and alert, well-developed  HEENT: PERR, EOMI, Normal Hearing, MMM  Neck: Soft and supple, No LAD, No JVD  Respiratory: Breath sounds are clear bilaterally, No wheezing, rales or rhonchi  Cardiovascular: S1 and S2, regular rate and rhythm, no Murmurs, gallops or rubs  Gastrointestinal: Bowel Sounds present, soft, nontender, nondistended, no guarding, no rebound  Extremities: No peripheral edema  Vascular: 2+ peripheral pulses  Neurological: A/O x 3, no focal deficits  Musculoskeletal: 5/5 strength b/l upper and lower extremities  Skin: No rashes    med/labs: Reviewed and interpreted         Assessment and Plan:  70 year old woman with COVID pneumonia.       COVID-19.  - inflammatory markers:  normalizing.  - CXR (preliminary):  no acute changes.  - supplemental O2 via NC.  - TONEY.  - completed course of dexamethasone and remdesivir.  - monitor off ABx   - DVT prophylaxis:  LMWH.    R adrenal nodule.  L hepatic lobe lesion.  - incidental indeterminate findings on CT imaging.  - MR of AB w/wo IVC recommended which she will get done inpatient and follow up with her pcp.    discharge home with home care.

## 2020-12-07 NOTE — DISCHARGE NOTE PROVIDER - NSDCCPCAREPLAN_GEN_ALL_CORE_FT
PRINCIPAL DISCHARGE DIAGNOSIS  Diagnosis: COVID-19  Assessment and Plan of Treatment: You completed treatment course of remdesivir and decadron.  take home dose aspirin as previously taken.   It has been determined that you no longer need hospitalization and can recover while remaining in self-quarantine at home for 14 days. You should follow the prevention steps below until a healthcare provider or [facility] says you can return to your normal activities.  Please remain in quarantine until then. You should restrict activities outside your home except for getting medical care.  Do not go to work, school or public areas.  Avoid using public transportation.  Separate yourself from other people and animals in your home.  Cover your coughs and sneezes.  Clean your hands often. Avoid sharing personal household items. Clean all high touch surfaces. Monitor your symptoms, if you have a medical emergency call 911.        SECONDARY DISCHARGE DIAGNOSES  Diagnosis: Hypoxia  Assessment and Plan of Treatment:

## 2020-12-08 PROBLEM — E78.5 HYPERLIPIDEMIA, UNSPECIFIED: Chronic | Status: ACTIVE | Noted: 2020-12-02

## 2020-12-08 PROBLEM — I10 ESSENTIAL (PRIMARY) HYPERTENSION: Chronic | Status: ACTIVE | Noted: 2020-12-02

## 2020-12-10 ENCOUNTER — NON-APPOINTMENT (OUTPATIENT)
Age: 70
End: 2020-12-10

## 2020-12-10 DIAGNOSIS — J12.89 OTHER VIRAL PNEUMONIA: ICD-10-CM

## 2020-12-10 DIAGNOSIS — E78.5 HYPERLIPIDEMIA, UNSPECIFIED: ICD-10-CM

## 2020-12-10 DIAGNOSIS — Z00.6 ENCOUNTER FOR EXAMINATION FOR NORMAL COMPARISON AND CONTROL IN CLINICAL RESEARCH PROGRAM: ICD-10-CM

## 2020-12-10 DIAGNOSIS — K76.89 OTHER SPECIFIED DISEASES OF LIVER: ICD-10-CM

## 2020-12-10 DIAGNOSIS — J96.01 ACUTE RESPIRATORY FAILURE WITH HYPOXIA: ICD-10-CM

## 2020-12-10 DIAGNOSIS — K44.9 DIAPHRAGMATIC HERNIA WITHOUT OBSTRUCTION OR GANGRENE: ICD-10-CM

## 2020-12-10 DIAGNOSIS — J84.9 INTERSTITIAL PULMONARY DISEASE, UNSPECIFIED: ICD-10-CM

## 2020-12-10 DIAGNOSIS — K59.00 CONSTIPATION, UNSPECIFIED: ICD-10-CM

## 2020-12-10 DIAGNOSIS — E27.9 DISORDER OF ADRENAL GLAND, UNSPECIFIED: ICD-10-CM

## 2020-12-10 DIAGNOSIS — Z79.82 LONG TERM (CURRENT) USE OF ASPIRIN: ICD-10-CM

## 2020-12-10 DIAGNOSIS — I10 ESSENTIAL (PRIMARY) HYPERTENSION: ICD-10-CM

## 2020-12-10 DIAGNOSIS — U07.1 COVID-19: ICD-10-CM

## 2020-12-21 PROBLEM — Z87.09 HISTORY OF ACUTE SINUSITIS: Status: RESOLVED | Noted: 2019-04-11 | Resolved: 2020-12-21

## 2020-12-21 PROBLEM — J06.9 ACUTE URI: Status: RESOLVED | Noted: 2019-10-30 | Resolved: 2020-12-21

## 2020-12-23 ENCOUNTER — APPOINTMENT (OUTPATIENT)
Dept: FAMILY MEDICINE | Facility: CLINIC | Age: 70
End: 2020-12-23
Payer: MEDICARE

## 2020-12-23 VITALS
BODY MASS INDEX: 37.95 KG/M2 | WEIGHT: 201 LBS | HEIGHT: 61 IN | DIASTOLIC BLOOD PRESSURE: 90 MMHG | SYSTOLIC BLOOD PRESSURE: 134 MMHG | OXYGEN SATURATION: 98 % | HEART RATE: 83 BPM | TEMPERATURE: 99.1 F | RESPIRATION RATE: 16 BRPM

## 2020-12-23 DIAGNOSIS — U07.1 COVID-19: ICD-10-CM

## 2020-12-23 DIAGNOSIS — J12.82 COVID-19: ICD-10-CM

## 2020-12-23 PROCEDURE — 99495 TRANSJ CARE MGMT MOD F2F 14D: CPT

## 2020-12-23 PROCEDURE — 99072 ADDL SUPL MATRL&STAF TM PHE: CPT

## 2020-12-24 ENCOUNTER — TRANSCRIPTION ENCOUNTER (OUTPATIENT)
Age: 70
End: 2020-12-24

## 2020-12-24 LAB
SARS-COV-2 IGG SERPL IA-ACNC: 8.41 INDEX
SARS-COV-2 IGG SERPL QL IA: POSITIVE

## 2020-12-31 PROBLEM — U07.1 COVID-19 VIRUS INFECTION: Status: ACTIVE | Noted: 2020-12-31

## 2020-12-31 NOTE — REVIEW OF SYSTEMS
[Dyspnea on Exertion] : no dyspnea on exertion [Heartburn] : no heartburn [Joint Pain] : joint pain [Negative] : Heme/Lymph

## 2020-12-31 NOTE — HISTORY OF PRESENT ILLNESS
[Post-hospitalization from ___ Hospital] : Post-hospitalization from [unfilled] Hospital [Admitted on: ___] : The patient was admitted on [unfilled] [Discharged on ___] : discharged on [unfilled] [Discharge Summary] : discharge summary [Pertinent Labs] : pertinent labs [Discharge Med List] : discharge medication list [Patient Contacted By: ____] : and contacted by [unfilled] [FreeTextEntry2] : COVID pneumonia\par Feels tired with activity, oxygenation okay\par Diagnosed 11/27 got hypoxic.

## 2020-12-31 NOTE — ASSESSMENT
[FreeTextEntry1] : s/p hospitalization for COVID\par Doing well\par Requesting Antibody test \par \par Adrenal Nodule \par See Endocrinology

## 2021-01-05 ENCOUNTER — APPOINTMENT (OUTPATIENT)
Dept: ENDOCRINOLOGY | Facility: CLINIC | Age: 71
End: 2021-01-05
Payer: MEDICARE

## 2021-01-05 VITALS
SYSTOLIC BLOOD PRESSURE: 112 MMHG | TEMPERATURE: 98.2 F | DIASTOLIC BLOOD PRESSURE: 70 MMHG | HEIGHT: 61 IN | WEIGHT: 202 LBS | RESPIRATION RATE: 16 BRPM | OXYGEN SATURATION: 98 % | BODY MASS INDEX: 38.14 KG/M2 | HEART RATE: 78 BPM

## 2021-01-05 PROCEDURE — 99072 ADDL SUPL MATRL&STAF TM PHE: CPT

## 2021-01-05 PROCEDURE — 99204 OFFICE O/P NEW MOD 45 MIN: CPT

## 2021-01-05 NOTE — REVIEW OF SYSTEMS
[Fatigue] : fatigue [Lower Ext Edema] : lower extremity edema [SOB on Exertion] : shortness of breath on exertion [Negative] : Endocrine [Decreased Appetite] : appetite not decreased [Recent Weight Gain (___ Lbs)] : no recent weight gain [Recent Weight Loss (___ Lbs)] : no recent weight loss [FreeTextEntry6] : cronis SOB with exertion

## 2021-01-05 NOTE — HISTORY OF PRESENT ILLNESS
[FreeTextEntry1] : The pt was referred for a R 1cm adrenal nodule. She has a hx of HTN x 5 yrs and recent addition of Losartan. No hx of a low potassium.\par The patient feels well except for some edema in feet\par No FH of adrenal disorders\par The pt does add salt to her food , but is not on any potassium suppliments

## 2021-01-05 NOTE — PHYSICAL EXAM
[Alert] : alert [No Acute Distress] : no acute distress [Normal Sclera/Conjunctiva] : normal sclera/conjunctiva [Normal Outer Ear/Nose] : the ears and nose were normal in appearance [No Neck Mass] : no neck mass was observed [No Thyroid Nodules] : no palpable thyroid nodules [No Respiratory Distress] : no respiratory distress [Clear to Auscultation] : lungs were clear to auscultation bilaterally [Regular Rhythm] : with a regular rhythm [No Edema] : no peripheral edema [Normal Supraclavicular Nodes] : no supraclavicular lymphadenopathy [Kyphosis] : kyphosis present [No Stigmata of Cushings Syndrome] : no stigmata of Cushings Syndrome [Normal Gait] : normal gait [Normal Reflexes] : deep tendon reflexes were 2+ and symmetric [No Tremors] : no tremors [Oriented x3] : oriented to person, place, and time [Normal Insight/Judgement] : insight and judgment were intact [de-identified] : mild

## 2021-01-05 NOTE — ASSESSMENT
[FreeTextEntry1] : Adrenal adenoma- R- benign appearing on this MRI, with no previous imaging to compare\par Her BP is controled with no HX of Hypokalemia\par \par We will observe at present and repeat a MRI in 6 mo- Or CT scan\par We will follow her serum K with her routine labs\par No need for 24hr urine oe serum renin/lynn at this time

## 2021-02-10 ENCOUNTER — APPOINTMENT (OUTPATIENT)
Dept: FAMILY MEDICINE | Facility: CLINIC | Age: 71
End: 2021-02-10
Payer: MEDICARE

## 2021-02-10 VITALS
RESPIRATION RATE: 16 BRPM | WEIGHT: 203 LBS | HEART RATE: 83 BPM | SYSTOLIC BLOOD PRESSURE: 138 MMHG | HEIGHT: 61 IN | TEMPERATURE: 98.5 F | OXYGEN SATURATION: 98 % | DIASTOLIC BLOOD PRESSURE: 82 MMHG | BODY MASS INDEX: 38.33 KG/M2

## 2021-02-10 DIAGNOSIS — Z23 ENCOUNTER FOR IMMUNIZATION: ICD-10-CM

## 2021-02-10 PROCEDURE — 90750 HZV VACC RECOMBINANT IM: CPT

## 2021-02-10 PROCEDURE — 99072 ADDL SUPL MATRL&STAF TM PHE: CPT

## 2021-02-10 PROCEDURE — 99214 OFFICE O/P EST MOD 30 MIN: CPT | Mod: 25

## 2021-02-10 PROCEDURE — 90471 IMMUNIZATION ADMIN: CPT

## 2021-02-10 NOTE — ASSESSMENT
[FreeTextEntry1] : Status post Covid in December all symptoms resolved\par GERD taking lansoprazole.  Rx renewed\par Patient advised to make changes to diet and lifestyle.\par Fatigue patient advised to increase activity leads a very sedentary lifestyle.\par Advised to schedule Covid vaccine\par Adrenal Nodule \par Seen by Endocrinology.  Follow-up in 6 months repeat MRI at that time.\par Hypertension blood pressure stable on losartan hydrochlorothiazide.  Advised to lose weight low-salt diet and regular exercise routine.\par Hyperlipidemia on statin renewed medication\par Shingles vaccine #2 administered.

## 2021-02-10 NOTE — REVIEW OF SYSTEMS
[Fever] : no fever [Chills] : no chills [Fatigue] : fatigue [Hot Flashes] : no hot flashes [Night Sweats] : no night sweats [Recent Change In Weight] : ~T no recent weight change [Dyspnea on Exertion] : no dyspnea on exertion [Heartburn] : no heartburn [Joint Pain] : joint pain [Negative] : Heme/Lymph

## 2021-02-10 NOTE — HISTORY OF PRESENT ILLNESS
[FreeTextEntry1] : follow up [de-identified] : Ms. ROBERTO MONTERO is a 68 year old female presenting for a follow up\par Feels tired leads a very sedentary lifestyle.\par Adrenal nodule seen by endocrinologist, Dr Abdullahi.  Advised to follow-up in 6 months and repeat MRI.\par Patient was hospitalized with Covid in December.  All symptoms resolved.\par History of GERD taking lansoprazole.  She states that sometimes has symptoms on my resolved with the medication.  Her diet is poor and she states that she eats a lot of fried foods tomato-based and coffee.\par Has some increased phlegm using Flonase.\par Shingles vaccine received in October here for the second vaccine.\par HTN on Losartan and HCTZ\par HLD on Simvastatin

## 2021-06-26 ENCOUNTER — APPOINTMENT (OUTPATIENT)
Dept: MRI IMAGING | Facility: CLINIC | Age: 71
End: 2021-06-26
Payer: MEDICARE

## 2021-06-26 ENCOUNTER — OUTPATIENT (OUTPATIENT)
Dept: OUTPATIENT SERVICES | Facility: HOSPITAL | Age: 71
LOS: 1 days | End: 2021-06-26
Payer: COMMERCIAL

## 2021-06-26 DIAGNOSIS — Z00.8 ENCOUNTER FOR OTHER GENERAL EXAMINATION: ICD-10-CM

## 2021-06-26 DIAGNOSIS — E27.8 OTHER SPECIFIED DISORDERS OF ADRENAL GLAND: ICD-10-CM

## 2021-06-26 PROCEDURE — 74183 MRI ABD W/O CNTR FLWD CNTR: CPT | Mod: 26

## 2021-06-26 PROCEDURE — A9585: CPT

## 2021-06-26 PROCEDURE — 74183 MRI ABD W/O CNTR FLWD CNTR: CPT

## 2021-07-07 ENCOUNTER — APPOINTMENT (OUTPATIENT)
Dept: ENDOCRINOLOGY | Facility: CLINIC | Age: 71
End: 2021-07-07
Payer: MEDICARE

## 2021-07-07 VITALS
HEART RATE: 80 BPM | RESPIRATION RATE: 16 BRPM | BODY MASS INDEX: 38.33 KG/M2 | OXYGEN SATURATION: 98 % | HEIGHT: 61 IN | WEIGHT: 203 LBS | SYSTOLIC BLOOD PRESSURE: 118 MMHG | DIASTOLIC BLOOD PRESSURE: 84 MMHG | TEMPERATURE: 97.6 F

## 2021-07-07 PROCEDURE — 99214 OFFICE O/P EST MOD 30 MIN: CPT

## 2021-07-07 NOTE — PHYSICAL EXAM
[Alert] : alert [No Acute Distress] : no acute distress [Normal Sclera/Conjunctiva] : normal sclera/conjunctiva [PERRL] : pupils equal, round and reactive to light [Normal Outer Ear/Nose] : the ears and nose were normal in appearance [No Neck Mass] : no neck mass was observed [Thyroid Not Enlarged] : the thyroid was not enlarged [No Thyroid Nodules] : no palpable thyroid nodules [No Respiratory Distress] : no respiratory distress [Clear to Auscultation] : lungs were clear to auscultation bilaterally [Regular Rhythm] : with a regular rhythm [No Edema] : no peripheral edema [Normal Supraclavicular Nodes] : no supraclavicular lymphadenopathy [Kyphosis] : kyphosis present [No Stigmata of Cushings Syndrome] : no stigmata of Cushings Syndrome [Normal Gait] : normal gait [Normal Reflexes] : deep tendon reflexes were 2+ and symmetric [No Tremors] : no tremors [Oriented x3] : oriented to person, place, and time [Normal Insight/Judgement] : insight and judgment were intact [de-identified] : mild [de-identified] : mild kyphosis

## 2021-07-07 NOTE — ASSESSMENT
[FreeTextEntry1] : Adrenal adenoma- R- benign appearing on this MRI, with no change on recent f/u MRI\par Her BP is controled with no HX of Hypokalemia\par \par We will observe at present and no need for repeat MRI\par We will follow her serum K with her routine labs\par No need for 24hr urine or serum renin/lynn at this time\par Ret if f/u needed

## 2021-07-07 NOTE — REVIEW OF SYSTEMS
[Fatigue] : fatigue [Recent Weight Loss (___ Lbs)] : recent weight loss: [unfilled] lbs [Lower Ext Edema] : lower extremity edema [SOB on Exertion] : shortness of breath on exertion [Negative] : Endocrine [Decreased Appetite] : appetite not decreased [Recent Weight Gain (___ Lbs)] : no recent weight gain [FreeTextEntry6] : cronis SOB with exertion

## 2021-07-07 NOTE — HISTORY OF PRESENT ILLNESS
[FreeTextEntry1] : The pt was referred for a R 1cm adrenal nodule. She has a hx of HTN x 5 yrs and recent addition of Losartan. No hx of a low potassium. BP has been controled\par The patient feels well except for some edema in feet\par No FH of adrenal disorders\par The pt does add salt to her food , but is not on any potassium suppliments

## 2021-08-31 ENCOUNTER — APPOINTMENT (OUTPATIENT)
Dept: GASTROENTEROLOGY | Facility: CLINIC | Age: 71
End: 2021-08-31
Payer: MEDICARE

## 2021-08-31 VITALS
WEIGHT: 206 LBS | BODY MASS INDEX: 38.89 KG/M2 | DIASTOLIC BLOOD PRESSURE: 78 MMHG | SYSTOLIC BLOOD PRESSURE: 120 MMHG | HEART RATE: 81 BPM | HEIGHT: 61 IN

## 2021-08-31 PROCEDURE — 99202 OFFICE O/P NEW SF 15 MIN: CPT

## 2021-08-31 NOTE — HISTORY OF PRESENT ILLNESS
[de-identified] : Ms. ROBERTO MONTERO is a 71 year old female with problems of heartburn and GERD. No dysphagia, weight loss, nocturnal symptoms. No complaints of cough. Patient never took prior medications. \par \par

## 2021-08-31 NOTE — ASSESSMENT
[FreeTextEntry1] : 72 yo female with history of GERD symptoms. Will recommend lifestyle modifications, trial of PPI, and arrange upper endoscopy.

## 2021-10-06 PROBLEM — U07.1 PNEUMONIA DUE TO COVID-19 VIRUS: Status: ACTIVE | Noted: 2020-12-23

## 2021-10-20 ENCOUNTER — NON-APPOINTMENT (OUTPATIENT)
Age: 71
End: 2021-10-20

## 2021-10-20 ENCOUNTER — APPOINTMENT (OUTPATIENT)
Dept: FAMILY MEDICINE | Facility: CLINIC | Age: 71
End: 2021-10-20
Payer: MEDICARE

## 2021-10-20 VITALS
WEIGHT: 212 LBS | OXYGEN SATURATION: 97 % | SYSTOLIC BLOOD PRESSURE: 128 MMHG | RESPIRATION RATE: 16 BRPM | HEART RATE: 72 BPM | TEMPERATURE: 97.6 F | DIASTOLIC BLOOD PRESSURE: 82 MMHG | BODY MASS INDEX: 40.02 KG/M2 | HEIGHT: 61 IN

## 2021-10-20 DIAGNOSIS — R53.82 CHRONIC FATIGUE, UNSPECIFIED: ICD-10-CM

## 2021-10-20 DIAGNOSIS — Z13.31 ENCOUNTER FOR SCREENING FOR DEPRESSION: ICD-10-CM

## 2021-10-20 DIAGNOSIS — Z13.39 ENCOUNTER FOR SCREENING EXAM FOR OTHER MENTAL HEALTH AND BEHAVIORAL DISORDERS: ICD-10-CM

## 2021-10-20 PROCEDURE — G0439: CPT

## 2021-10-20 PROCEDURE — 93000 ELECTROCARDIOGRAM COMPLETE: CPT | Mod: 59

## 2021-10-20 PROCEDURE — G0442 ANNUAL ALCOHOL SCREEN 15 MIN: CPT | Mod: 59

## 2021-10-20 PROCEDURE — G0444 DEPRESSION SCREEN ANNUAL: CPT

## 2021-10-20 RX ORDER — LANSOPRAZOLE 30 MG/1
30 CAPSULE, DELAYED RELEASE ORAL
Qty: 90 | Refills: 0 | Status: COMPLETED | COMMUNITY
Start: 2020-10-14 | End: 2021-10-20

## 2021-10-20 NOTE — HEALTH RISK ASSESSMENT
[Good] : ~his/her~  mood as  good [Yes] : Yes [Monthly or less (1 pt)] : Monthly or less (1 point) [1 or 2 (0 pts)] : 1 or 2 (0 points) [Never (0 pts)] : Never (0 points) [No] : In the past 12 months have you used drugs other than those required for medical reasons? No [No falls in past year] : Patient reported no falls in the past year [0] : 2) Feeling down, depressed, or hopeless: Not at all (0) [PHQ-2 Negative - No further assessment needed] : PHQ-2 Negative - No further assessment needed [Patient reported mammogram was normal] : Patient reported mammogram was normal [Patient reported bone density results were abnormal] : Patient reported bone density results were abnormal [Patient reported colonoscopy was normal] : Patient reported colonoscopy was normal [Smoke Detector] : smoke detector [Carbon Monoxide Detector] : carbon monoxide detector [Seat Belt] :  uses seat belt [Sunscreen] : uses sunscreen [Patient/Caregiver unclear of wishes] : , patient/caregiver unclear of wishes [Relationship: ___] : Relationship: [unfilled] [Alone] : lives alone [Fully functional (bathing, dressing, toileting, transferring, walking, feeding)] : Fully functional (bathing, dressing, toileting, transferring, walking, feeding) [Fully functional (using the telephone, shopping, preparing meals, housekeeping, doing laundry, using] : Fully functional and needs no help or supervision to perform IADLs (using the telephone, shopping, preparing meals, housekeeping, doing laundry, using transportation, managing medications and managing finances) [] : No [Audit-CScore] : 1 [[id number]] : 0 [Change in mental status noted] : No change in mental status noted [Sexually Active] : not sexually active [Reports changes in hearing] : Reports no changes in hearing [Reports changes in vision] : Reports no changes in vision [Reports changes in dental health] : Reports no changes in dental health [TB Exposure] : is not being exposed to tuberculosis [MammogramDate] : 2/2020 [BoneDensityDate] : 2/2020 [BoneDensityComments] : osteopenia [ColonoscopyDate] : 1/2014 [AdvancecareDate] : 10/2021 [FreeTextEntry4] : probably her son, she will get the paperwork.

## 2021-10-20 NOTE — REVIEW OF SYSTEMS
[Fatigue] : fatigue [Joint Pain] : joint pain [Joint Stiffness] : joint stiffness [Negative] : Heme/Lymph [Fever] : no fever [Chills] : no chills [Hot Flashes] : no hot flashes [Night Sweats] : no night sweats [Recent Change In Weight] : ~T no recent weight change [Dyspnea on Exertion] : no dyspnea on exertion [Heartburn] : no heartburn [FreeTextEntry7] : see hpi

## 2021-10-20 NOTE — ASSESSMENT
[FreeTextEntry1] : Annual\par SBIRT negative\par Depression screen negative\par Check comprehensive labs, in office today\par Vaccines up to date \par EKG NSR no acute changes.\par \par Mammogram 2/2020.\par Colonoscopy 2014\par \par GERD: \par Seeing GI scheduled to get Endoscopy. Pantoprazole started. The PPI that i gave her worked the same according to patient.\par Seen by Endo Dr Abdullahi suggested DEXA, SHe had one 2/20. States that she has "acid buildup".\par \par DJD: Has noticed some pain and numbness in the hand. Has trouble opening cans.\par Advised to increase activity. Use the ball to squeeze and move fingers. \par \par Leg edema: Weight fluctuates, notices some leg swelling on and off.\par Compression stocking/ Leg elevation\par \par Fatigue patient advised to increase activity leads a very sedentary lifestyle.\par \par Adrenal Nodule \par Seen by Endocrinology.  Follow-up in 6 months repeat MRI at that time.\par \par Hypertension blood pressure stable on losartan hydrochlorothiazide.  Advised to lose weight low-salt diet and regular exercise routine.\par \par Hyperlipidemia on statin renewed medication\par Shingles vaccine #2 administered.

## 2021-10-20 NOTE — COUNSELING
[Potential consequences of obesity discussed] : Potential consequences of obesity discussed [Benefits of weight loss discussed] : Benefits of weight loss discussed [Encouraged to increase physical activity] : Encouraged to increase physical activity [Encouraged to use exercise tracking device] : Encouraged to use exercise tracking device [Weigh Self Weekly] : weigh self weekly [Decrease Portions] : decrease portions [____ min/wk Activity] : [unfilled] min/wk activity [Keep Food Diary] : keep food diary

## 2021-10-20 NOTE — HISTORY OF PRESENT ILLNESS
[FreeTextEntry1] : annual [de-identified] : Ms. ROBERTO MONTERO is a 68 year old female presenting for an annual\par Seeing GI scheduled to get Endoscopy. Pantoprazole started. The PPI that i gave her worked the same according to patient.\par Seen by Endo Dr Abdullahi suggested DEXA, SHe had one 2/20. States that she has "acid buildup".\par Has noticed some pain and numbness in the hand. Has trouble opening cans.\par Weight fluctuates, notices some leg swelling on and off.\par Feels tired leads a very sedentary lifestyle.\par Adrenal nodule seen by endocrinologist, Dr Abdullahi.  Advised to follow-up in 6 months and repeat MRI.\par Patient was hospitalized with Covid in December.  All symptoms resolved.\par History of GERD taking lansoprazole.  She states that sometimes has symptoms on my resolved with the medication.  Her diet is poor and she states that she eats a lot of fried foods tomato-based and coffee.\par Has some increased phlegm using Flonase.\par Shingles vaccine received in October here for the second vaccine.\par HTN on Losartan and HCTZ\par HLD on Simvastatin

## 2021-10-24 ENCOUNTER — TRANSCRIPTION ENCOUNTER (OUTPATIENT)
Age: 71
End: 2021-10-24

## 2021-10-24 LAB
25(OH)D3 SERPL-MCNC: 18.9 NG/ML
ALBUMIN SERPL ELPH-MCNC: 4.6 G/DL
ALP BLD-CCNC: 83 U/L
ALT SERPL-CCNC: 22 U/L
ANION GAP SERPL CALC-SCNC: 12 MMOL/L
APPEARANCE: CLEAR
AST SERPL-CCNC: 21 U/L
BASOPHILS # BLD AUTO: 0.05 K/UL
BASOPHILS NFR BLD AUTO: 0.6 %
BILIRUB SERPL-MCNC: 0.6 MG/DL
BILIRUBIN URINE: NEGATIVE
BLOOD URINE: NEGATIVE
BUN SERPL-MCNC: 17 MG/DL
CALCIUM SERPL-MCNC: 9.7 MG/DL
CHLORIDE SERPL-SCNC: 105 MMOL/L
CHOLEST SERPL-MCNC: 150 MG/DL
CO2 SERPL-SCNC: 26 MMOL/L
COLOR: NORMAL
CREAT SERPL-MCNC: 0.78 MG/DL
EOSINOPHIL # BLD AUTO: 0.24 K/UL
EOSINOPHIL NFR BLD AUTO: 3.1 %
ESTIMATED AVERAGE GLUCOSE: 120 MG/DL
GLUCOSE QUALITATIVE U: NEGATIVE
GLUCOSE SERPL-MCNC: 89 MG/DL
HBA1C MFR BLD HPLC: 5.8 %
HCT VFR BLD CALC: 41.3 %
HDLC SERPL-MCNC: 56 MG/DL
HGB BLD-MCNC: 12.9 G/DL
IMM GRANULOCYTES NFR BLD AUTO: 0.3 %
KETONES URINE: NEGATIVE
LDLC SERPL CALC-MCNC: 76 MG/DL
LEUKOCYTE ESTERASE URINE: NEGATIVE
LYMPHOCYTES # BLD AUTO: 2.37 K/UL
LYMPHOCYTES NFR BLD AUTO: 30.3 %
MAN DIFF?: NORMAL
MCHC RBC-ENTMCNC: 26.9 PG
MCHC RBC-ENTMCNC: 31.2 GM/DL
MCV RBC AUTO: 86.2 FL
MONOCYTES # BLD AUTO: 0.38 K/UL
MONOCYTES NFR BLD AUTO: 4.9 %
NEUTROPHILS # BLD AUTO: 4.77 K/UL
NEUTROPHILS NFR BLD AUTO: 60.8 %
NITRITE URINE: NEGATIVE
NONHDLC SERPL-MCNC: 94 MG/DL
PH URINE: 6.5
PLATELET # BLD AUTO: 206 K/UL
POTASSIUM SERPL-SCNC: 4.3 MMOL/L
PROT SERPL-MCNC: 6.9 G/DL
PROTEIN URINE: NEGATIVE
RBC # BLD: 4.79 M/UL
RBC # FLD: 14.5 %
SODIUM SERPL-SCNC: 142 MMOL/L
SPECIFIC GRAVITY URINE: 1.02
TRIGL SERPL-MCNC: 92 MG/DL
TSH SERPL-ACNC: 1.93 UIU/ML
URATE SERPL-MCNC: 4.7 MG/DL
UROBILINOGEN URINE: NORMAL
WBC # FLD AUTO: 7.83 K/UL

## 2021-10-24 RX ORDER — UBIDECARENONE/VIT E ACET 100MG-5
50 MCG CAPSULE ORAL
Refills: 0 | Status: DISCONTINUED | COMMUNITY
End: 2021-10-24

## 2021-10-27 LAB — HEMOCCULT STL QL IA: NEGATIVE

## 2021-10-31 ENCOUNTER — APPOINTMENT (OUTPATIENT)
Dept: DISASTER EMERGENCY | Facility: CLINIC | Age: 71
End: 2021-10-31

## 2021-10-31 DIAGNOSIS — Z01.818 ENCOUNTER FOR OTHER PREPROCEDURAL EXAMINATION: ICD-10-CM

## 2021-10-31 LAB — SARS-COV-2 N GENE NPH QL NAA+PROBE: NOT DETECTED

## 2021-11-03 ENCOUNTER — APPOINTMENT (OUTPATIENT)
Dept: GASTROENTEROLOGY | Facility: HOSPITAL | Age: 71
End: 2021-11-03
Payer: MEDICARE

## 2021-11-03 ENCOUNTER — RESULT REVIEW (OUTPATIENT)
Age: 71
End: 2021-11-03

## 2021-11-03 ENCOUNTER — OUTPATIENT (OUTPATIENT)
Dept: OUTPATIENT SERVICES | Facility: HOSPITAL | Age: 71
LOS: 1 days | Discharge: HOME | End: 2021-11-03
Payer: MEDICARE

## 2021-11-03 VITALS
DIASTOLIC BLOOD PRESSURE: 75 MMHG | SYSTOLIC BLOOD PRESSURE: 150 MMHG | HEART RATE: 78 BPM | OXYGEN SATURATION: 97 % | HEIGHT: 61 IN | RESPIRATION RATE: 18 BRPM | TEMPERATURE: 97 F | WEIGHT: 212.08 LBS

## 2021-11-03 DIAGNOSIS — K21.9 GASTRO-ESOPHAGEAL REFLUX DISEASE WITHOUT ESOPHAGITIS: ICD-10-CM

## 2021-11-03 PROCEDURE — 88312 SPECIAL STAINS GROUP 1: CPT

## 2021-11-03 PROCEDURE — 88305 TISSUE EXAM BY PATHOLOGIST: CPT

## 2021-11-03 PROCEDURE — 88312 SPECIAL STAINS GROUP 1: CPT | Mod: 26

## 2021-11-03 PROCEDURE — 88313 SPECIAL STAINS GROUP 2: CPT

## 2021-11-03 PROCEDURE — 88313 SPECIAL STAINS GROUP 2: CPT | Mod: 26

## 2021-11-03 PROCEDURE — 43239 EGD BIOPSY SINGLE/MULTIPLE: CPT

## 2021-11-03 PROCEDURE — 88305 TISSUE EXAM BY PATHOLOGIST: CPT | Mod: 26

## 2021-11-03 RX ORDER — LANSOPRAZOLE 15 MG/1
1 CAPSULE, DELAYED RELEASE ORAL
Qty: 0 | Refills: 0 | DISCHARGE

## 2021-11-03 RX ORDER — ALBUTEROL 90 UG/1
2 AEROSOL, METERED ORAL
Qty: 0 | Refills: 0 | DISCHARGE

## 2021-11-09 DIAGNOSIS — K31.89 OTHER DISEASES OF STOMACH AND DUODENUM: ICD-10-CM

## 2021-11-09 DIAGNOSIS — E66.9 OBESITY, UNSPECIFIED: ICD-10-CM

## 2021-11-09 DIAGNOSIS — M19.90 UNSPECIFIED OSTEOARTHRITIS, UNSPECIFIED SITE: ICD-10-CM

## 2021-11-09 DIAGNOSIS — R12 HEARTBURN: ICD-10-CM

## 2021-11-09 DIAGNOSIS — K22.89 OTHER SPECIFIED DISEASE OF ESOPHAGUS: ICD-10-CM

## 2021-11-09 DIAGNOSIS — I10 ESSENTIAL (PRIMARY) HYPERTENSION: ICD-10-CM

## 2021-11-09 DIAGNOSIS — K44.9 DIAPHRAGMATIC HERNIA WITHOUT OBSTRUCTION OR GANGRENE: ICD-10-CM

## 2021-11-09 DIAGNOSIS — Z79.82 LONG TERM (CURRENT) USE OF ASPIRIN: ICD-10-CM

## 2021-11-09 DIAGNOSIS — K21.9 GASTRO-ESOPHAGEAL REFLUX DISEASE WITHOUT ESOPHAGITIS: ICD-10-CM

## 2021-11-09 DIAGNOSIS — Z87.891 PERSONAL HISTORY OF NICOTINE DEPENDENCE: ICD-10-CM

## 2021-11-09 DIAGNOSIS — K29.50 UNSPECIFIED CHRONIC GASTRITIS WITHOUT BLEEDING: ICD-10-CM

## 2021-11-09 DIAGNOSIS — E78.5 HYPERLIPIDEMIA, UNSPECIFIED: ICD-10-CM

## 2022-05-04 ENCOUNTER — APPOINTMENT (OUTPATIENT)
Dept: FAMILY MEDICINE | Facility: CLINIC | Age: 72
End: 2022-05-04
Payer: MEDICARE

## 2022-05-04 VITALS
BODY MASS INDEX: 42.1 KG/M2 | TEMPERATURE: 97.2 F | HEIGHT: 61 IN | OXYGEN SATURATION: 99 % | HEART RATE: 80 BPM | SYSTOLIC BLOOD PRESSURE: 142 MMHG | WEIGHT: 223 LBS | RESPIRATION RATE: 16 BRPM | DIASTOLIC BLOOD PRESSURE: 82 MMHG

## 2022-05-04 DIAGNOSIS — M79.671 PAIN IN RIGHT FOOT: ICD-10-CM

## 2022-05-04 PROCEDURE — 99214 OFFICE O/P EST MOD 30 MIN: CPT | Mod: 25

## 2022-05-04 RX ORDER — PANTOPRAZOLE 40 MG/1
40 TABLET, DELAYED RELEASE ORAL DAILY
Qty: 90 | Refills: 3 | Status: COMPLETED | COMMUNITY
Start: 2021-08-31 | End: 2022-05-04

## 2022-05-04 RX ORDER — ERGOCALCIFEROL 1.25 MG/1
1.25 MG CAPSULE, LIQUID FILLED ORAL
Qty: 12 | Refills: 0 | Status: COMPLETED | COMMUNITY
Start: 2021-10-24 | End: 2022-05-04

## 2022-05-04 NOTE — PHYSICAL EXAM
[Well Nourished] : well nourished [Well Developed] : well developed [Well-Appearing] : well-appearing [Normal Sclera/Conjunctiva] : normal sclera/conjunctiva [Normal Outer Ear/Nose] : the outer ears and nose were normal in appearance [No Lymphadenopathy] : no lymphadenopathy [Supple] : supple [No Respiratory Distress] : no respiratory distress  [No Accessory Muscle Use] : no accessory muscle use [Clear to Auscultation] : lungs were clear to auscultation bilaterally [Normal Rate] : normal rate  [Regular Rhythm] : with a regular rhythm [Normal S1, S2] : normal S1 and S2 [No Murmur] : no murmur heard [Pedal Pulses Present] : the pedal pulses are present [No Edema] : there was no peripheral edema [No Extremity Clubbing/Cyanosis] : no extremity clubbing/cyanosis [Soft] : abdomen soft [Non Tender] : non-tender [Non-distended] : non-distended [No Masses] : no abdominal mass palpated [Normal Bowel Sounds] : normal bowel sounds [Normal Posterior Cervical Nodes] : no posterior cervical lymphadenopathy [Normal Anterior Cervical Nodes] : no anterior cervical lymphadenopathy [No CVA Tenderness] : no CVA  tenderness [No Spinal Tenderness] : no spinal tenderness [No Joint Swelling] : no joint swelling [Grossly Normal Strength/Tone] : grossly normal strength/tone [No Rash] : no rash [Coordination Grossly Intact] : coordination grossly intact [No Focal Deficits] : no focal deficits [Normal Gait] : normal gait [Deep Tendon Reflexes (DTR)] : deep tendon reflexes were 2+ and symmetric [Normal Affect] : the affect was normal [Normal Insight/Judgement] : insight and judgment were intact [de-identified] : Some swelling noted on her ankle with ecchymosis on the medial upper aspect near the heel.  No tenderness elicited in phalanx.  Mildly tender on the lateral part of the right foot.

## 2022-05-04 NOTE — HISTORY OF PRESENT ILLNESS
[FreeTextEntry1] : Follow up [de-identified] : Ms. ROBERTO MONTERO is a 68 year old female presenting for a follow up.\par She states she fell in her son's house last week.  When she fell she twisted her right ankle inwards.  She has severe pain near her ankle [place]  X-rays were done which she was reported to her 2 days later as a possible nondisplaced fracture of the first phalanx.  She was advised to Ace wrap and use the boot provided.\par She took Motrin with minimal relief.  She has been using ice which helps.\par Adrenal nodule seen by endocrinologist, Dr Abdullahi.  Advised to follow-up in 6 months and repeat MRI.\par Hypertension stable on current medication\par Hyperlipidemia on simvastatin due for labs.\par Obesity weight has increased since her last visit.  She leads a very sedentary lifestyle and diet is poor\par \par \par Prev Hx: Seeing GI scheduled to get Endoscopy. Pantoprazole started. The PPI that i gave her worked the same according to patient.\par Seen by Endo Dr Abdullahi suggested DEXA, SHe had one 2/20. States that she has "acid buildup".\par Has noticed some pain and numbness in the hand. Has trouble opening cans.\par Weight fluctuates, notices some leg swelling on and off.\par Feels tired leads a very sedentary lifestyle.\par \par Patient was hospitalized with Covid in December.  All symptoms resolved.\par History of GERD taking lansoprazole.  She states that sometimes has symptoms on my resolved with the medication.  Her diet is poor and she states that she eats a lot of fried foods tomato-based and coffee.\par Has some increased phlegm using Flonase.\par Shingles vaccine received in October here for the second vaccine.\par HTN on Losartan and HCTZ\par HLD on Simvastatin

## 2022-05-04 NOTE — ASSESSMENT
[FreeTextEntry1] : Right foot and ankle pain\par History of fall last week\par Hypertension\par Hyperlipidemia\par Obesity\par Adrenal nodule\par \par \par

## 2022-05-04 NOTE — REVIEW OF SYSTEMS
[Fever] : no fever [Chills] : no chills [Fatigue] : fatigue [Hot Flashes] : no hot flashes [Night Sweats] : no night sweats [Recent Change In Weight] : ~T no recent weight change [Dyspnea on Exertion] : no dyspnea on exertion [Heartburn] : no heartburn [Joint Pain] : joint pain [Joint Stiffness] : joint stiffness [Negative] : Heme/Lymph [FreeTextEntry7] : see hpi [FreeTextEntry9] : see hpi

## 2022-05-04 NOTE — PLAN
[FreeTextEntry1] : Right foot and ankle pain\par s/p fall\par Continue Ace wrap and ice.\par Use boot when active weightbearing.\par Foot elevation when seated.  Advised regarding stretching exercises.\par Meloxicam Rx given\par Referred to orthopedist Dr. Levy\par \par \par Hypertension blood pressure stable on losartan hydrochlorothiazide.  Advised to lose weight low-salt diet and regular exercise routine.\par \par Hyperlipidemia on statin renewed medication\par Adrenal Nodule \par Seen by Endocrinology.  Follow-up in 6 months repeat MRI at that time.\par States she has noted some more facial hair in the past 6-months, will discuss with Dr. Abdullahi.\par \par GERD: \par Seeing GI scheduled to get Endoscopy. Pantoprazole started. The PPI that i gave her worked the same according to patient.\par Seen by Endo Dr Abdullahi suggested DEXA, SHe had one 2/20. States that she has "acid buildup".\par \par DJD: Has noticed some pain and numbness in the hand. Has trouble opening cans.\par Advised to increase activity. Use the ball to squeeze and move fingers. \par \par Leg edema: Weight fluctuates, notices some leg swelling on and off.\par Compression stocking/ Leg elevation\par \par Fatigue patient advised to increase activity leads a very sedentary lifestyle.\par Follow-up 6 months

## 2022-05-09 ENCOUNTER — TRANSCRIPTION ENCOUNTER (OUTPATIENT)
Age: 72
End: 2022-05-09

## 2022-05-09 LAB
ALBUMIN SERPL ELPH-MCNC: 4.4 G/DL
ALP BLD-CCNC: 73 U/L
ALT SERPL-CCNC: 13 U/L
ANION GAP SERPL CALC-SCNC: 13 MMOL/L
AST SERPL-CCNC: 19 U/L
BILIRUB SERPL-MCNC: 0.5 MG/DL
BUN SERPL-MCNC: 15 MG/DL
CALCIUM SERPL-MCNC: 9.7 MG/DL
CHLORIDE SERPL-SCNC: 103 MMOL/L
CHOLEST SERPL-MCNC: 139 MG/DL
CO2 SERPL-SCNC: 27 MMOL/L
CREAT SERPL-MCNC: 0.81 MG/DL
EGFR: 78 ML/MIN/1.73M2
ESTIMATED AVERAGE GLUCOSE: 126 MG/DL
GLUCOSE SERPL-MCNC: 92 MG/DL
HBA1C MFR BLD HPLC: 6 %
HDLC SERPL-MCNC: 44 MG/DL
LDLC SERPL CALC-MCNC: 67 MG/DL
NONHDLC SERPL-MCNC: 95 MG/DL
POTASSIUM SERPL-SCNC: 3.8 MMOL/L
PROT SERPL-MCNC: 6.7 G/DL
SODIUM SERPL-SCNC: 143 MMOL/L
TRIGL SERPL-MCNC: 143 MG/DL
TSH SERPL-ACNC: 1.97 UIU/ML

## 2022-05-27 ENCOUNTER — APPOINTMENT (OUTPATIENT)
Dept: ORTHOPEDIC SURGERY | Facility: CLINIC | Age: 72
End: 2022-05-27

## 2022-07-07 NOTE — PHYSICAL THERAPY INITIAL EVALUATION ADULT - ADL SKILLS, REHAB EVAL
54 yo f no pmh here for chest pressure. pt endorsed an episode of chest pressure yesterday lasting 2 hours. CP sternal, no n/v/diaphoresis. sx improved w/o intervention. at work, pt had similar chest pressure w/ radiation to L shoulder. no lightheadedness/syncope.  pt was at work and BP was elevated so pt given amlodipine.
independent
Skin normal color for race, warm, dry and intact. No evidence of rash.

## 2022-07-13 ENCOUNTER — NON-APPOINTMENT (OUTPATIENT)
Age: 72
End: 2022-07-13

## 2022-07-20 ENCOUNTER — APPOINTMENT (OUTPATIENT)
Dept: ENDOCRINOLOGY | Facility: CLINIC | Age: 72
End: 2022-07-20

## 2022-08-30 DIAGNOSIS — Z12.39 ENCOUNTER FOR OTHER SCREENING FOR MALIGNANT NEOPLASM OF BREAST: ICD-10-CM

## 2022-10-03 ENCOUNTER — APPOINTMENT (OUTPATIENT)
Dept: MAMMOGRAPHY | Facility: CLINIC | Age: 72
End: 2022-10-03

## 2022-10-03 ENCOUNTER — APPOINTMENT (OUTPATIENT)
Dept: RADIOLOGY | Facility: CLINIC | Age: 72
End: 2022-10-03

## 2022-10-03 ENCOUNTER — OUTPATIENT (OUTPATIENT)
Dept: OUTPATIENT SERVICES | Facility: HOSPITAL | Age: 72
LOS: 1 days | End: 2022-10-03
Payer: COMMERCIAL

## 2022-10-03 ENCOUNTER — RESULT REVIEW (OUTPATIENT)
Age: 72
End: 2022-10-03

## 2022-10-03 DIAGNOSIS — Z12.39 ENCOUNTER FOR OTHER SCREENING FOR MALIGNANT NEOPLASM OF BREAST: ICD-10-CM

## 2022-10-03 DIAGNOSIS — Z00.8 ENCOUNTER FOR OTHER GENERAL EXAMINATION: ICD-10-CM

## 2022-10-03 DIAGNOSIS — M85.80 OTHER SPECIFIED DISORDERS OF BONE DENSITY AND STRUCTURE, UNSPECIFIED SITE: ICD-10-CM

## 2022-10-03 PROCEDURE — 77063 BREAST TOMOSYNTHESIS BI: CPT | Mod: 26

## 2022-10-03 PROCEDURE — 77067 SCR MAMMO BI INCL CAD: CPT | Mod: 26

## 2022-10-03 PROCEDURE — 77080 DXA BONE DENSITY AXIAL: CPT

## 2022-10-03 PROCEDURE — 77067 SCR MAMMO BI INCL CAD: CPT

## 2022-10-03 PROCEDURE — 77080 DXA BONE DENSITY AXIAL: CPT | Mod: 26

## 2022-10-03 PROCEDURE — 77063 BREAST TOMOSYNTHESIS BI: CPT

## 2022-10-09 ENCOUNTER — TRANSCRIPTION ENCOUNTER (OUTPATIENT)
Age: 72
End: 2022-10-09

## 2022-11-30 ENCOUNTER — APPOINTMENT (OUTPATIENT)
Dept: GASTROENTEROLOGY | Facility: CLINIC | Age: 72
End: 2022-11-30

## 2022-11-30 VITALS
SYSTOLIC BLOOD PRESSURE: 152 MMHG | WEIGHT: 211 LBS | DIASTOLIC BLOOD PRESSURE: 96 MMHG | BODY MASS INDEX: 39.84 KG/M2 | HEIGHT: 61 IN | HEART RATE: 80 BPM

## 2022-11-30 DIAGNOSIS — Z12.11 ENCOUNTER FOR SCREENING FOR MALIGNANT NEOPLASM OF COLON: ICD-10-CM

## 2022-11-30 PROCEDURE — 99214 OFFICE O/P EST MOD 30 MIN: CPT

## 2022-11-30 RX ORDER — PANTOPRAZOLE 40 MG/1
40 TABLET, DELAYED RELEASE ORAL DAILY
Qty: 60 | Refills: 3 | Status: DISCONTINUED | COMMUNITY
Start: 2021-11-03 | End: 2022-11-30

## 2022-11-30 RX ORDER — MECLIZINE HYDROCHLORIDE 25 MG/1
25 TABLET ORAL 3 TIMES DAILY
Qty: 30 | Refills: 1 | Status: DISCONTINUED | COMMUNITY
Start: 2020-10-08 | End: 2022-11-30

## 2022-11-30 RX ORDER — MELOXICAM 15 MG/1
15 TABLET ORAL
Qty: 15 | Refills: 0 | Status: DISCONTINUED | COMMUNITY
Start: 2022-05-04 | End: 2022-11-30

## 2022-11-30 NOTE — PHYSICAL EXAM

## 2022-11-30 NOTE — HISTORY OF PRESENT ILLNESS
[FreeTextEntry1] : Ms. ROBERTO MONTERO is a 72 year old female presents for screening colonoscopy. Patient has no complaints of bowel issues, bleeding, abdominal pain, family history of colon cancer. Patient had last colonoscopy several years ago. Patient also has ongoing issues with heartburn and GERD. Endoscopy done last year showed evidence of esophagitis and ulcer.\par \par 
No

## 2022-11-30 NOTE — ASSESSMENT
[FreeTextEntry1] : 71 yo female screening colonoscopy with history of refractory GERD. Will arrange colonoscopy and upper endoscopy.

## 2022-12-04 ENCOUNTER — FORM ENCOUNTER (OUTPATIENT)
Age: 72
End: 2022-12-04

## 2022-12-05 ENCOUNTER — OFFICE (OUTPATIENT)
Dept: URBAN - METROPOLITAN AREA CLINIC 12 | Facility: CLINIC | Age: 72
Setting detail: OPHTHALMOLOGY
End: 2022-12-05
Payer: MEDICARE

## 2022-12-05 DIAGNOSIS — H35.40: ICD-10-CM

## 2022-12-05 DIAGNOSIS — H35.373: ICD-10-CM

## 2022-12-05 DIAGNOSIS — H43.393: ICD-10-CM

## 2022-12-05 DIAGNOSIS — H25.13: ICD-10-CM

## 2022-12-05 DIAGNOSIS — H11.153: ICD-10-CM

## 2022-12-05 DIAGNOSIS — H35.033: ICD-10-CM

## 2022-12-05 DIAGNOSIS — H02.831: ICD-10-CM

## 2022-12-05 DIAGNOSIS — H02.834: ICD-10-CM

## 2022-12-05 PROCEDURE — 92014 COMPRE OPH EXAM EST PT 1/>: CPT | Performed by: OPHTHALMOLOGY

## 2022-12-05 PROCEDURE — 92250 FUNDUS PHOTOGRAPHY W/I&R: CPT | Performed by: OPHTHALMOLOGY

## 2022-12-05 ASSESSMENT — SPHEQUIV_DERIVED
OD_SPHEQUIV: 2
OS_SPHEQUIV: 1
OD_SPHEQUIV: 2
OS_SPHEQUIV: 1

## 2022-12-05 ASSESSMENT — LID POSITION - DERMATOCHALASIS: OD_DERMATOCHALASIS: T 1+

## 2022-12-05 ASSESSMENT — REFRACTION_MANIFEST
OD_CYLINDER: -1.00
OS_VA1: 20/30-1
OS_AXIS: 113
OS_ADD: +2.50
OD_SPHERE: +2.50
OD_AXIS: 063
OD_VA1: 20/30-1
OD_ADD: +2.50
OS_CYLINDER: -0.50
OS_SPHERE: +1.25

## 2022-12-05 ASSESSMENT — REFRACTION_CURRENTRX
OS_OVR_VA: 20/
OD_AXIS: 057
OD_AXIS: 18
OS_SPHERE: +2.50
OD_OVR_VA: 20/
OS_AXIS: 120
OD_VPRISM_DIRECTION: SV
OD_CYLINDER: -0.50
OD_CYLINDER: -1.00
OD_VPRISM_DIRECTION: SV
OS_CYLINDER: -0.50
OS_OVR_VA: 20/
OS_VPRISM_DIRECTION: SV
OD_SPHERE: +1.50
OS_SPHERE: +1.25
OD_SPHERE: +2.50
OD_OVR_VA: 20/
OS_VPRISM_DIRECTION: SV
OS_CYLINDER: SPHERE

## 2022-12-05 ASSESSMENT — REFRACTION_AUTOREFRACTION
OS_SPHERE: +1.25
OD_AXIS: 063
OD_CYLINDER: -1.00
OS_AXIS: 113
OS_CYLINDER: -0.50
OD_SPHERE: +2.50

## 2022-12-05 ASSESSMENT — AXIALLENGTH_DERIVED
OS_AL: 22.3116
OD_AL: 22.1289
OD_AL: 22.1289
OS_AL: 22.3116

## 2022-12-05 ASSESSMENT — VISUAL ACUITY
OS_BCVA: 20/30-1
OD_BCVA: 20/40-1

## 2022-12-05 ASSESSMENT — PACHYMETRY
OD_CT_CORRECTION: >7
OS_CT_UM: 125
OS_CT_CORRECTION: >7
OD_CT_UM: 250

## 2022-12-05 ASSESSMENT — KERATOMETRY
OD_AXISANGLE_DEGREES: 126
OD_K1POWER_DIOPTERS: 45.50
OS_K2POWER_DIOPTERS: 46.50
METHOD_AUTO_MANUAL: AUTO
OS_AXISANGLE_DEGREES: 065
OD_K2POWER_DIOPTERS: 45.75
OS_K1POWER_DIOPTERS: 45.75

## 2022-12-05 ASSESSMENT — CONFRONTATIONAL VISUAL FIELD TEST (CVF)
OD_FINDINGS: FULL
OS_FINDINGS: FULL

## 2022-12-05 ASSESSMENT — SUPERFICIAL PUNCTATE KERATITIS (SPK)
OD_SPK: T
OS_SPK: T

## 2022-12-05 ASSESSMENT — TONOMETRY
OS_IOP_MMHG: 18
OD_IOP_MMHG: 19

## 2023-01-05 ENCOUNTER — NON-APPOINTMENT (OUTPATIENT)
Age: 73
End: 2023-01-05

## 2023-01-05 ENCOUNTER — APPOINTMENT (OUTPATIENT)
Dept: FAMILY MEDICINE | Facility: CLINIC | Age: 73
End: 2023-01-05
Payer: MEDICARE

## 2023-01-05 VITALS
DIASTOLIC BLOOD PRESSURE: 86 MMHG | OXYGEN SATURATION: 99 % | WEIGHT: 216 LBS | SYSTOLIC BLOOD PRESSURE: 110 MMHG | TEMPERATURE: 98.2 F | RESPIRATION RATE: 16 BRPM | BODY MASS INDEX: 40.78 KG/M2 | HEART RATE: 82 BPM | HEIGHT: 61 IN

## 2023-01-05 DIAGNOSIS — Z00.00 ENCOUNTER FOR GENERAL ADULT MEDICAL EXAMINATION W/OUT ABNORMAL FINDINGS: ICD-10-CM

## 2023-01-05 DIAGNOSIS — Z23 ENCOUNTER FOR IMMUNIZATION: ICD-10-CM

## 2023-01-05 PROCEDURE — 93000 ELECTROCARDIOGRAM COMPLETE: CPT | Mod: 59

## 2023-01-05 PROCEDURE — 90677 PCV20 VACCINE IM: CPT

## 2023-01-05 PROCEDURE — G0439: CPT

## 2023-01-05 PROCEDURE — G0009: CPT

## 2023-01-05 PROCEDURE — G0447 BEHAVIOR COUNSEL OBESITY 15M: CPT | Mod: 59

## 2023-01-05 PROCEDURE — G0444 DEPRESSION SCREEN ANNUAL: CPT

## 2023-01-05 RX ORDER — ASPIRIN 325 MG/1
325 TABLET, FILM COATED ORAL DAILY
Qty: 90 | Refills: 0 | Status: DISCONTINUED | COMMUNITY
Start: 2020-12-23 | End: 2023-01-05

## 2023-01-05 NOTE — HISTORY OF PRESENT ILLNESS
[FreeTextEntry1] : Annual [de-identified] : Ms. ROBERTO MONTERO is a 68 year old female presenting for an annual\par She states that she is taking aspirin since she had covid 2 years ago\par Going to Dentist because cap fell off. He asked her why she is taking Aspirin. She states she was told by the hospital to take it\par Colonoscopy scheduled in March 26th. Dr Olson put her on Omeprazole 2 weeks ago\par Seen by Eye doctor cataract getting worse. Follow up in 6 months Dr Bui\par Foot doctor is Dr Tovar, States it is 90 % better \par She states she fell in her son's house last week.  When she fell she twisted her right ankle inwards.  She has severe pain near her ankle was seen in [facility] MD.  X-rays were done which she was reported to her 2 days later as a possible nondisplaced fracture of the first phalanx.  She was advised to Ace wrap and use the boot provided.\par She took Motrin with minimal relief.  She has been using ice which helps.\par Adrenal nodule seen by endocrinologist, Dr Abdullahi.  Advised to follow-up in 6 months and repeat MRI.\par Hypertension stable on current medication\par Hyperlipidemia on simvastatin due for labs.\par Obesity weight has increased since her last visit.  She leads a very sedentary lifestyle and diet is poor\par Seen by Endo Dr Abdullahi Adrenal nodule has not follow up MRI 6/21\par October appointment cancelled because he retired.\par \par Patient was hospitalized with Covid in December.  All symptoms resolved.\par History of GERD taking PPI was told she had an ulcer on Endoscopy.\par HTN on Losartan and HCTZ\par HLD on Simvastatin

## 2023-01-05 NOTE — HEALTH RISK ASSESSMENT
[Good] : ~his/her~  mood as  good [Former] : Former [5-9] : 5-9 [Yes] : Yes [Monthly or less (1 pt)] : Monthly or less (1 point) [1 or 2 (0 pts)] : 1 or 2 (0 points) [Never (0 pts)] : Never (0 points) [No] : In the past 12 months have you used drugs other than those required for medical reasons? No [No falls in past year] : Patient reported no falls in the past year [0] : 2) Feeling down, depressed, or hopeless: Not at all (0) [Patient reported mammogram was normal] : Patient reported mammogram was normal [Patient reported colonoscopy was normal] : Patient reported colonoscopy was normal [Patient reported bone density results were abnormal] : Patient reported bone density results were abnormal [Alone] : lives alone [Fully functional (bathing, dressing, toileting, transferring, walking, feeding)] : Fully functional (bathing, dressing, toileting, transferring, walking, feeding) [Fully functional (using the telephone, shopping, preparing meals, housekeeping, doing laundry, using] : Fully functional and needs no help or supervision to perform IADLs (using the telephone, shopping, preparing meals, housekeeping, doing laundry, using transportation, managing medications and managing finances) [Smoke Detector] : smoke detector [Carbon Monoxide Detector] : carbon monoxide detector [Seat Belt] :  uses seat belt [Sunscreen] : uses sunscreen [Reviewed updated] : Reviewed, updated [Designated Healthcare Proxy] : Designated healthcare proxy [Name: ___] : Health Care Proxy's Name: [unfilled]  [Relationship: ___] : Relationship: [unfilled] [DNR] : DNR [YearQuit] : 1972 [Audit-CScore] : 1 [[id number]] : 0 [Change in mental status noted] : No change in mental status noted [Reports changes in hearing] : Reports no changes in hearing [Reports changes in vision] : Reports no changes in vision [Reports changes in dental health] : Reports no changes in dental health [TB Exposure] : is not being exposed to tuberculosis [MammogramDate] : 10/2022 [BoneDensityDate] : 10/2022 [BoneDensityComments] : osteopenia [ColonoscopyDate] : 1/2014 [HIVDate] : 7/2019 [HepatitisCDate] : 7/2019 [AdvancecareDate] : 1/2023

## 2023-01-05 NOTE — ASSESSMENT
[FreeTextEntry1] : Annual\par SBIRT negative\par Depression screen negative\par Check comprehensive labs, in office today\par Vaccines up to date \par Prevnar 20 administered\par EKG NSR no acute changes.\par \par Mammogram 2/2020.\par Colonoscopy scheduled 3/23, last 2014.\par HCP Son\par \par GERD: \par Seeing GI scheduled to get Endoscopy. Pantoprazole started. The PPI that i gave her worked the same according to patient.\par \par Adrenal Nodule\par MRI 6/21\par Seen by Endo Dr Abdullahi\par Will schedule follow up with Dr Monroy or Dr Medeiros.\par \par Osteopenia\par DEXA 10/22\par Bone density shows osteopenia, some bone loss is seen.\par Resistance exercise diet rich in calcium and adequate Vitamin D supplements \par Check Vit D levels.\par \par \par Obesity \par Discussed implications of obesity.  Patient motivated to lose weight.\par Advised to try for apps like my fitness pal/lose it\par Start regular exercise routine\par Check weight weekly\par Decrease portions.\par States she is on golow diet.\par Has lost some weight according to patient.\par \par Leg edema: Weight fluctuates, notices some leg swelling on and off.\par Compression stocking/ Leg elevation\par \par Fatigue patient advised to increase activity leads a very sedentary lifestyle.\par \par Adrenal Nodule \par Seen by Endocrinology.  Follow-up due, repeat MRI at that time.\par \par Hypertension blood pressure stable on losartan hydrochlorothiazide.  Advised to lose weight low-salt diet and regular exercise routine.\par \par Hyperlipidemia on Simvastatin renewed medication\par Follow up 3 months\par

## 2023-01-05 NOTE — COUNSELING
[Potential consequences of obesity discussed] : Potential consequences of obesity discussed [Benefits of weight loss discussed] : Benefits of weight loss discussed [Encouraged to increase physical activity] : Encouraged to increase physical activity [Encouraged to use exercise tracking device] : Encouraged to use exercise tracking device [Weigh Self Weekly] : weigh self weekly [Decrease Portions] : decrease portions [____ min/wk Activity] : [unfilled] min/wk activity [Keep Food Diary] : keep food diary [Needs reinforcement, provided] : Patient needs reinforcement on understanding of disease, goals and obesity follow-up plan; reinforcement was provided [FreeTextEntry4] : 15

## 2023-01-05 NOTE — REVIEW OF SYSTEMS
[Fatigue] : fatigue [Joint Pain] : joint pain [Joint Stiffness] : joint stiffness [Negative] : Heme/Lymph [Fever] : no fever [Chills] : no chills [Hot Flashes] : no hot flashes [Night Sweats] : no night sweats [Recent Change In Weight] : ~T no recent weight change [Dyspnea on Exertion] : no dyspnea on exertion [Heartburn] : no heartburn [FreeTextEntry7] : see hpi [FreeTextEntry9] : see hpi

## 2023-01-05 NOTE — PHYSICAL EXAM
[Well Nourished] : well nourished [Well Developed] : well developed [Well-Appearing] : well-appearing [Normal Sclera/Conjunctiva] : normal sclera/conjunctiva [Normal Outer Ear/Nose] : the outer ears and nose were normal in appearance [No Lymphadenopathy] : no lymphadenopathy [Supple] : supple [No Respiratory Distress] : no respiratory distress  [No Accessory Muscle Use] : no accessory muscle use [Clear to Auscultation] : lungs were clear to auscultation bilaterally [Normal Rate] : normal rate  [Regular Rhythm] : with a regular rhythm [Normal S1, S2] : normal S1 and S2 [No Murmur] : no murmur heard [Pedal Pulses Present] : the pedal pulses are present [No Edema] : there was no peripheral edema [No Extremity Clubbing/Cyanosis] : no extremity clubbing/cyanosis [Soft] : abdomen soft [Non Tender] : non-tender [Non-distended] : non-distended [No Masses] : no abdominal mass palpated [Normal Bowel Sounds] : normal bowel sounds [Normal Posterior Cervical Nodes] : no posterior cervical lymphadenopathy [Normal Anterior Cervical Nodes] : no anterior cervical lymphadenopathy [No CVA Tenderness] : no CVA  tenderness [No Spinal Tenderness] : no spinal tenderness [No Joint Swelling] : no joint swelling [Grossly Normal Strength/Tone] : grossly normal strength/tone [No Rash] : no rash [Coordination Grossly Intact] : coordination grossly intact [No Focal Deficits] : no focal deficits [Normal Gait] : normal gait [Deep Tendon Reflexes (DTR)] : deep tendon reflexes were 2+ and symmetric [Normal Affect] : the affect was normal [Normal Insight/Judgement] : insight and judgment were intact [de-identified] : Some swelling noted on her ankle with ecchymosis on the medial upper aspect near the heel.  No tenderness elicited in phalanx.  Mildly tender on the lateral part of the right foot.

## 2023-01-09 ENCOUNTER — TRANSCRIPTION ENCOUNTER (OUTPATIENT)
Age: 73
End: 2023-01-09

## 2023-01-12 LAB
25(OH)D3 SERPL-MCNC: 20.4 NG/ML
ALBUMIN SERPL ELPH-MCNC: 4.5 G/DL
ALP BLD-CCNC: 85 U/L
ALT SERPL-CCNC: 10 U/L
ANION GAP SERPL CALC-SCNC: 13 MMOL/L
APPEARANCE: CLEAR
AST SERPL-CCNC: 14 U/L
BASOPHILS # BLD AUTO: 0.04 K/UL
BASOPHILS NFR BLD AUTO: 0.4 %
BILIRUB SERPL-MCNC: 0.5 MG/DL
BILIRUBIN URINE: NEGATIVE
BLOOD URINE: NEGATIVE
BUN SERPL-MCNC: 12 MG/DL
CALCIUM SERPL-MCNC: 9.7 MG/DL
CHLORIDE SERPL-SCNC: 103 MMOL/L
CHOLEST SERPL-MCNC: 136 MG/DL
CO2 SERPL-SCNC: 28 MMOL/L
COLOR: NORMAL
CREAT SERPL-MCNC: 0.86 MG/DL
EGFR: 72 ML/MIN/1.73M2
EOSINOPHIL # BLD AUTO: 0.14 K/UL
EOSINOPHIL NFR BLD AUTO: 1.5 %
ESTIMATED AVERAGE GLUCOSE: 126 MG/DL
GLUCOSE QUALITATIVE U: NEGATIVE
GLUCOSE SERPL-MCNC: 98 MG/DL
HBA1C MFR BLD HPLC: 6 %
HCT VFR BLD CALC: 38.9 %
HDLC SERPL-MCNC: 47 MG/DL
HGB BLD-MCNC: 12.2 G/DL
IMM GRANULOCYTES NFR BLD AUTO: 0.2 %
KETONES URINE: NEGATIVE
LDLC SERPL CALC-MCNC: 65 MG/DL
LEUKOCYTE ESTERASE URINE: NEGATIVE
LYMPHOCYTES # BLD AUTO: 2.6 K/UL
LYMPHOCYTES NFR BLD AUTO: 27.6 %
MAN DIFF?: NORMAL
MCHC RBC-ENTMCNC: 26.5 PG
MCHC RBC-ENTMCNC: 31.4 GM/DL
MCV RBC AUTO: 84.4 FL
MONOCYTES # BLD AUTO: 0.46 K/UL
MONOCYTES NFR BLD AUTO: 4.9 %
NEUTROPHILS # BLD AUTO: 6.16 K/UL
NEUTROPHILS NFR BLD AUTO: 65.4 %
NITRITE URINE: NEGATIVE
NONHDLC SERPL-MCNC: 89 MG/DL
PH URINE: 7.5
PLATELET # BLD AUTO: 229 K/UL
POTASSIUM SERPL-SCNC: 3.7 MMOL/L
PROT SERPL-MCNC: 6.9 G/DL
PROTEIN URINE: NEGATIVE
RBC # BLD: 4.61 M/UL
RBC # FLD: 14.8 %
SODIUM SERPL-SCNC: 143 MMOL/L
SPECIFIC GRAVITY URINE: 1.02
TRIGL SERPL-MCNC: 118 MG/DL
TSH SERPL-ACNC: 1.51 UIU/ML
URATE SERPL-MCNC: 5.4 MG/DL
UROBILINOGEN URINE: NORMAL
WBC # FLD AUTO: 9.42 K/UL

## 2023-03-06 ENCOUNTER — FORM ENCOUNTER (OUTPATIENT)
Age: 73
End: 2023-03-06

## 2023-03-07 ENCOUNTER — APPOINTMENT (OUTPATIENT)
Dept: INTERNAL MEDICINE | Facility: CLINIC | Age: 73
End: 2023-03-07
Payer: MEDICARE

## 2023-03-07 VITALS
WEIGHT: 212 LBS | DIASTOLIC BLOOD PRESSURE: 70 MMHG | HEART RATE: 70 BPM | RESPIRATION RATE: 16 BRPM | OXYGEN SATURATION: 98 % | SYSTOLIC BLOOD PRESSURE: 120 MMHG | BODY MASS INDEX: 40.02 KG/M2 | HEIGHT: 61 IN | TEMPERATURE: 97.4 F

## 2023-03-07 DIAGNOSIS — M25.562 PAIN IN LEFT KNEE: ICD-10-CM

## 2023-03-07 DIAGNOSIS — R10.9 UNSPECIFIED ABDOMINAL PAIN: ICD-10-CM
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
## 2023-03-07 PROCEDURE — 99213 OFFICE O/P EST LOW 20 MIN: CPT

## 2023-03-07 NOTE — HISTORY OF PRESENT ILLNESS
[FreeTextEntry8] : Ms. ROBERTO MONTERO  is    72 year female . \par She is a pt. of Dr Knapp.\par pt. presents today stating that last Thursday she fell at the gym, it was accidental she fell face down.  She had a bruise in her left knee she was putting ice and taking ibuprofen pain in the left knee is much better.  She has been in the left side of her breast area and upper abdomen denied any shortness of breath.  Patient stated pain is more when she twists and turns her body.\par \par \par

## 2023-03-07 NOTE — PHYSICAL EXAM
[Normal] : normal rate, regular rhythm, normal S1 and S2 and no murmur heard [de-identified] : Mild tenderness left upper quadrant.  No swelling of the abdomen. [de-identified] : Bruise on the left knee [de-identified] : No skin changes of the abdomen and chest.

## 2023-03-07 NOTE — ASSESSMENT
[FreeTextEntry1] : Patient presented today with pain in her left knee left side of the chest and upper abdomen.  She had a accidental fall at the gym last Thursday.  She denied any shortness of breath.\par Ordered x-ray left knee.\par Ordered chest x-ray to rule out any rib fracture.  No local tenderness on the chest wall.\par Abdominal ultrasound order as patient complaining of abdominal pain.\par Recommended ice compression on the left knee ibuprofen as needed for pain I will follow-up the test results and get back to patient.\par \par HTN: BP is on goal today.\par He is on losartan 25 mg , hctz 12.5 mg

## 2023-03-09 ENCOUNTER — APPOINTMENT (OUTPATIENT)
Dept: ENDOCRINOLOGY | Facility: CLINIC | Age: 73
End: 2023-03-09
Payer: MEDICARE

## 2023-03-09 VITALS
OXYGEN SATURATION: 99 % | BODY MASS INDEX: 39.65 KG/M2 | SYSTOLIC BLOOD PRESSURE: 150 MMHG | HEART RATE: 81 BPM | HEIGHT: 61 IN | DIASTOLIC BLOOD PRESSURE: 82 MMHG | WEIGHT: 210 LBS

## 2023-03-09 PROCEDURE — 99215 OFFICE O/P EST HI 40 MIN: CPT

## 2023-03-12 ENCOUNTER — FORM ENCOUNTER (OUTPATIENT)
Age: 73
End: 2023-03-12

## 2023-03-14 ENCOUNTER — NON-APPOINTMENT (OUTPATIENT)
Age: 73
End: 2023-03-14

## 2023-03-16 ENCOUNTER — TRANSCRIPTION ENCOUNTER (OUTPATIENT)
Age: 73
End: 2023-03-16

## 2023-03-16 ENCOUNTER — OUTPATIENT (OUTPATIENT)
Dept: OUTPATIENT SERVICES | Facility: HOSPITAL | Age: 73
LOS: 1 days | Discharge: HOME | End: 2023-03-16
Payer: MEDICARE

## 2023-03-16 ENCOUNTER — APPOINTMENT (OUTPATIENT)
Dept: GASTROENTEROLOGY | Facility: HOSPITAL | Age: 73
End: 2023-03-16

## 2023-03-16 VITALS
WEIGHT: 214.95 LBS | SYSTOLIC BLOOD PRESSURE: 131 MMHG | DIASTOLIC BLOOD PRESSURE: 78 MMHG | HEART RATE: 67 BPM | OXYGEN SATURATION: 98 % | HEIGHT: 61 IN | RESPIRATION RATE: 17 BRPM | TEMPERATURE: 97 F

## 2023-03-16 DIAGNOSIS — Z98.890 OTHER SPECIFIED POSTPROCEDURAL STATES: Chronic | ICD-10-CM

## 2023-03-16 DIAGNOSIS — Z12.11 ENCOUNTER FOR SCREENING FOR MALIGNANT NEOPLASM OF COLON: ICD-10-CM

## 2023-03-16 DIAGNOSIS — K21.9 GASTRO-ESOPHAGEAL REFLUX DISEASE WITHOUT ESOPHAGITIS: ICD-10-CM

## 2023-03-16 PROCEDURE — 44360 SMALL BOWEL ENDOSCOPY: CPT

## 2023-03-16 PROCEDURE — 45378 DIAGNOSTIC COLONOSCOPY: CPT

## 2023-03-16 RX ORDER — SIMVASTATIN 20 MG/1
1 TABLET, FILM COATED ORAL
Qty: 0 | Refills: 0 | DISCHARGE

## 2023-03-16 RX ORDER — LOSARTAN POTASSIUM 100 MG/1
1 TABLET, FILM COATED ORAL
Qty: 0 | Refills: 0 | DISCHARGE

## 2023-03-16 RX ORDER — PANTOPRAZOLE SODIUM 20 MG/1
1 TABLET, DELAYED RELEASE ORAL
Qty: 0 | Refills: 0 | DISCHARGE

## 2023-03-16 RX ORDER — ASPIRIN/CALCIUM CARB/MAGNESIUM 324 MG
1 TABLET ORAL
Qty: 0 | Refills: 0 | DISCHARGE

## 2023-03-16 RX ORDER — CHOLECALCIFEROL (VITAMIN D3) 125 MCG
1 CAPSULE ORAL
Qty: 0 | Refills: 0 | DISCHARGE

## 2023-03-16 RX ORDER — HYDROCHLOROTHIAZIDE 25 MG
1 TABLET ORAL
Qty: 0 | Refills: 0 | DISCHARGE

## 2023-03-16 NOTE — ASU PATIENT PROFILE, ADULT - CENTRAL VENOUS CATHETER
[facility]            (For Outpatient Use Only) Initial Admit Date: 3/20/2021   Inpt/Obs Admit Date: Inpt: 3/20/21 / Obs: N/A   Discharge Date:    Jessie Colon:  [de-identified]   MRN: [de-identified]   CSN: [id number]   CEID: [id number]        VBZ Subscriber: SELF   TERTIARY INSURANCE   Payor:  Plan:    Group Number:  Insurance Type:    Subscriber Name:  Subscriber :    Subscriber ID:  Pt Rel to Subscriber:    Hospital Account Financial Class: Medicare    2021 no

## 2023-03-16 NOTE — ASU PATIENT PROFILE, ADULT - FALL HARM RISK - RISK INTERVENTIONS

## 2023-03-17 ENCOUNTER — NON-APPOINTMENT (OUTPATIENT)
Age: 73
End: 2023-03-17

## 2023-03-22 DIAGNOSIS — Z12.11 ENCOUNTER FOR SCREENING FOR MALIGNANT NEOPLASM OF COLON: ICD-10-CM

## 2023-03-22 DIAGNOSIS — M19.90 UNSPECIFIED OSTEOARTHRITIS, UNSPECIFIED SITE: ICD-10-CM

## 2023-03-22 DIAGNOSIS — K21.9 GASTRO-ESOPHAGEAL REFLUX DISEASE WITHOUT ESOPHAGITIS: ICD-10-CM

## 2023-03-22 DIAGNOSIS — M85.80 OTHER SPECIFIED DISORDERS OF BONE DENSITY AND STRUCTURE, UNSPECIFIED SITE: ICD-10-CM

## 2023-03-22 DIAGNOSIS — R16.0 HEPATOMEGALY, NOT ELSEWHERE CLASSIFIED: ICD-10-CM

## 2023-03-22 DIAGNOSIS — I10 ESSENTIAL (PRIMARY) HYPERTENSION: ICD-10-CM

## 2023-03-22 DIAGNOSIS — Z79.82 LONG TERM (CURRENT) USE OF ASPIRIN: ICD-10-CM

## 2023-03-22 DIAGNOSIS — E78.5 HYPERLIPIDEMIA, UNSPECIFIED: ICD-10-CM

## 2023-03-22 DIAGNOSIS — E66.9 OBESITY, UNSPECIFIED: ICD-10-CM

## 2023-03-22 DIAGNOSIS — K29.70 GASTRITIS, UNSPECIFIED, WITHOUT BLEEDING: ICD-10-CM

## 2023-03-22 DIAGNOSIS — K44.9 DIAPHRAGMATIC HERNIA WITHOUT OBSTRUCTION OR GANGRENE: ICD-10-CM

## 2023-03-26 PROBLEM — K21.9 GASTRO-ESOPHAGEAL REFLUX DISEASE WITHOUT ESOPHAGITIS: Chronic | Status: ACTIVE | Noted: 2023-03-16

## 2023-04-08 ENCOUNTER — OUTPATIENT (OUTPATIENT)
Dept: OUTPATIENT SERVICES | Facility: HOSPITAL | Age: 73
LOS: 1 days | End: 2023-04-08
Payer: COMMERCIAL

## 2023-04-08 ENCOUNTER — APPOINTMENT (OUTPATIENT)
Dept: MRI IMAGING | Facility: CLINIC | Age: 73
End: 2023-04-08

## 2023-04-08 ENCOUNTER — APPOINTMENT (OUTPATIENT)
Dept: RADIOLOGY | Facility: CLINIC | Age: 73
End: 2023-04-08
Payer: MEDICARE

## 2023-04-08 ENCOUNTER — APPOINTMENT (OUTPATIENT)
Dept: RADIOLOGY | Facility: CLINIC | Age: 73
End: 2023-04-08

## 2023-04-08 ENCOUNTER — APPOINTMENT (OUTPATIENT)
Dept: MRI IMAGING | Facility: CLINIC | Age: 73
End: 2023-04-08
Payer: MEDICARE

## 2023-04-08 DIAGNOSIS — Z00.8 ENCOUNTER FOR OTHER GENERAL EXAMINATION: ICD-10-CM

## 2023-04-08 DIAGNOSIS — R16.0 HEPATOMEGALY, NOT ELSEWHERE CLASSIFIED: ICD-10-CM

## 2023-04-08 DIAGNOSIS — Z98.890 OTHER SPECIFIED POSTPROCEDURAL STATES: Chronic | ICD-10-CM

## 2023-04-08 PROCEDURE — 74183 MRI ABD W/O CNTR FLWD CNTR: CPT

## 2023-04-08 PROCEDURE — 74183 MRI ABD W/O CNTR FLWD CNTR: CPT | Mod: 26

## 2023-04-08 PROCEDURE — A9585: CPT

## 2023-04-13 ENCOUNTER — TRANSCRIPTION ENCOUNTER (OUTPATIENT)
Age: 73
End: 2023-04-13

## 2023-04-20 ENCOUNTER — APPOINTMENT (OUTPATIENT)
Dept: FAMILY MEDICINE | Facility: CLINIC | Age: 73
End: 2023-04-20
Payer: MEDICARE

## 2023-04-20 VITALS
TEMPERATURE: 97 F | RESPIRATION RATE: 16 BRPM | WEIGHT: 205.25 LBS | OXYGEN SATURATION: 98 % | HEART RATE: 73 BPM | DIASTOLIC BLOOD PRESSURE: 82 MMHG | SYSTOLIC BLOOD PRESSURE: 132 MMHG | HEIGHT: 61 IN | BODY MASS INDEX: 38.75 KG/M2

## 2023-04-20 PROCEDURE — 99214 OFFICE O/P EST MOD 30 MIN: CPT

## 2023-04-20 RX ORDER — SODIUM PICOSULFATE, MAGNESIUM OXIDE, AND ANHYDROUS CITRIC ACID 10; 3.5; 12 MG/160ML; G/160ML; G/160ML
10-3.5-12 MG-GM LIQUID ORAL
Qty: 1 | Refills: 0 | Status: COMPLETED | COMMUNITY
Start: 2022-11-30 | End: 2023-04-20

## 2023-04-23 NOTE — PHYSICAL EXAM
[Well Nourished] : well nourished [Well Developed] : well developed [Well-Appearing] : well-appearing [Normal Sclera/Conjunctiva] : normal sclera/conjunctiva [Normal Outer Ear/Nose] : the outer ears and nose were normal in appearance [No Lymphadenopathy] : no lymphadenopathy [Supple] : supple [No Respiratory Distress] : no respiratory distress  [No Accessory Muscle Use] : no accessory muscle use [Clear to Auscultation] : lungs were clear to auscultation bilaterally [Normal Rate] : normal rate  [Regular Rhythm] : with a regular rhythm [Normal S1, S2] : normal S1 and S2 [No Murmur] : no murmur heard [Pedal Pulses Present] : the pedal pulses are present [No Edema] : there was no peripheral edema [No Extremity Clubbing/Cyanosis] : no extremity clubbing/cyanosis [Soft] : abdomen soft [Non Tender] : non-tender [Non-distended] : non-distended [No Masses] : no abdominal mass palpated [Normal Bowel Sounds] : normal bowel sounds [No CVA Tenderness] : no CVA  tenderness [No Spinal Tenderness] : no spinal tenderness [No Joint Swelling] : no joint swelling [Grossly Normal Strength/Tone] : grossly normal strength/tone [No Rash] : no rash [Coordination Grossly Intact] : coordination grossly intact [No Focal Deficits] : no focal deficits [Normal Gait] : normal gait [Normal Affect] : the affect was normal [Deep Tendon Reflexes (DTR)] : deep tendon reflexes were 2+ and symmetric [Normal Insight/Judgement] : insight and judgment were intact [de-identified] : Some swelling noted on her ankle with ecchymosis on the medial upper aspect near the heel.  No tenderness elicited in phalanx.  Mildly tender on the lateral part of the right foot.

## 2023-04-23 NOTE — HISTORY OF PRESENT ILLNESS
[FreeTextEntry1] : Follow up [de-identified] : Ms. ROBERTO MONTERO is a 72 year old female presenting for a follow up.\par States she started taking 5000 IU for low vitamin D\par Seen by Dr Monroy adrenal nodule seen on MRI.\par Small cyst and angiomyolipoma renal\par Had an MRI recently to follow up on this.\par Colonoscopy  March 2023. Dr Olson put her on Omeprazole.\par Seen by Eye doctor cataract getting worse\par Hypertension on Losartan and HCTZ.\par Hyperlipidemia on simvastatin .\par Obesity weight has increased since her last visit.  She leads a very sedentary lifestyle and diet is poor\par Seen by Endo Dr Abdullahi Adrenal nodule has not follow up MRI 6/21\par October appointment cancelled because he retired.\par \par Patient was hospitalized with Covid in December.  All symptoms resolved.\par History of GERD taking PPI was told she had an ulcer on Endoscopy.\par HTN on Losartan and HCTZ\par HLD on Simvastatin

## 2023-04-23 NOTE — ASSESSMENT
[FreeTextEntry1] : GERD: \par Seeing GI Dr Olson \par Had Endoscopy, Omeprazole.\par \par Adrenal Nodule\par MRI 4/23\par Seen by Endo \par 24 hr urine ordered not done yet.\par Will schedule follow up with Dr Monroy \par \par Osteopenia\par DEXA 10/22\par Bone density shows osteopenia, some bone loss is seen.\par Resistance exercise diet rich in calcium and adequate Vitamin D supplements \par Vit D 5000 IU daily\par \par Obesity \par aware of implications of obesity.  Patient motivated to lose weight.\par Advised to reduce portions.\par regular exercise routine, states she does an hour 2 x a week of the chair exercises\par Check weight weekly\par Follow up in 3-4 months.\par Decrease portions.\par Has lost some weight 5 lbs.\par \par Hypertension blood pressure stable on losartan hydrochlorothiazide.  Advised to lose weight low-salt diet and regular exercise routine.\par \par Hyperlipidemia on Simvastatin.\par \par Cataracts monitored by ophthalmologist.\par \par Follow up 3 months\par

## 2023-04-23 NOTE — REVIEW OF SYSTEMS
[Fatigue] : fatigue [Joint Pain] : joint pain [Joint Stiffness] : joint stiffness [Negative] : Heme/Lymph [Fever] : no fever [Chills] : no chills [Hot Flashes] : no hot flashes [Night Sweats] : no night sweats [Recent Change In Weight] : ~T no recent weight change [Dyspnea on Exertion] : no dyspnea on exertion [FreeTextEntry7] : see hpi [Heartburn] : no heartburn [FreeTextEntry9] : see hpi

## 2023-07-12 NOTE — ASU PATIENT PROFILE, ADULT - HOW PATIENT ADDRESSED, PROFILE
Carmen impairments found/functional limitations in following categories/risk reduction/prevention/anticipated discharge recommendation

## 2023-07-20 ENCOUNTER — APPOINTMENT (OUTPATIENT)
Dept: FAMILY MEDICINE | Facility: CLINIC | Age: 73
End: 2023-07-20
Payer: MEDICARE

## 2023-07-20 VITALS
TEMPERATURE: 97.3 F | DIASTOLIC BLOOD PRESSURE: 65 MMHG | HEIGHT: 61 IN | WEIGHT: 208 LBS | OXYGEN SATURATION: 97 % | HEART RATE: 110 BPM | SYSTOLIC BLOOD PRESSURE: 135 MMHG | RESPIRATION RATE: 16 BRPM | BODY MASS INDEX: 39.27 KG/M2

## 2023-07-20 DIAGNOSIS — K21.9 GASTRO-ESOPHAGEAL REFLUX DISEASE W/OUT ESOPHAGITIS: ICD-10-CM

## 2023-07-20 DIAGNOSIS — M19.90 UNSPECIFIED OSTEOARTHRITIS, UNSPECIFIED SITE: ICD-10-CM

## 2023-07-20 DIAGNOSIS — G62.9 POLYNEUROPATHY, UNSPECIFIED: ICD-10-CM

## 2023-07-20 PROCEDURE — 99214 OFFICE O/P EST MOD 30 MIN: CPT

## 2023-07-20 RX ORDER — OMEPRAZOLE 40 MG/1
40 CAPSULE, DELAYED RELEASE ORAL
Qty: 90 | Refills: 0 | Status: ACTIVE | COMMUNITY
Start: 2022-11-30

## 2023-07-20 NOTE — PHYSICAL EXAM
[Well Nourished] : well nourished [Well Developed] : well developed [Well-Appearing] : well-appearing [Normal Sclera/Conjunctiva] : normal sclera/conjunctiva [Normal Outer Ear/Nose] : the outer ears and nose were normal in appearance [No Lymphadenopathy] : no lymphadenopathy [Supple] : supple [No Respiratory Distress] : no respiratory distress  [No Accessory Muscle Use] : no accessory muscle use [Clear to Auscultation] : lungs were clear to auscultation bilaterally [Normal Rate] : normal rate  [Regular Rhythm] : with a regular rhythm [Normal S1, S2] : normal S1 and S2 [No Murmur] : no murmur heard [Pedal Pulses Present] : the pedal pulses are present [No Edema] : there was no peripheral edema [No Extremity Clubbing/Cyanosis] : no extremity clubbing/cyanosis [Soft] : abdomen soft [Non Tender] : non-tender [Non-distended] : non-distended [No Masses] : no abdominal mass palpated [Normal Bowel Sounds] : normal bowel sounds [No CVA Tenderness] : no CVA  tenderness [No Spinal Tenderness] : no spinal tenderness [No Joint Swelling] : no joint swelling [Grossly Normal Strength/Tone] : grossly normal strength/tone [No Rash] : no rash [Coordination Grossly Intact] : coordination grossly intact [No Focal Deficits] : no focal deficits [Normal Gait] : normal gait [Deep Tendon Reflexes (DTR)] : deep tendon reflexes were 2+ and symmetric [Normal Affect] : the affect was normal [Normal Insight/Judgement] : insight and judgment were intact [de-identified] : Some swelling noted on her ankle with ecchymosis on the medial upper aspect near the heel.  No tenderness elicited in phalanx.  Mildly tender on the lateral part of the right foot.

## 2023-07-20 NOTE — ASSESSMENT
[FreeTextEntry1] : GERD: \par Seeing GI Dr Olson \par States she has a sensation of food getting stuck n\par No choking or aspiration\par Was seen by GI endo was okay\par Planning to see ENT.\par Had Endoscopy, Omeprazole. rx renewed\par States she take sit prn.\par \par Foot pain/ Neuropathy\par Try NSAIDs\par see Neurology if not improved. Referra given\par \par Adrenal Nodule\par MRI 4/23\par Seen by Endo \par 24 hr urine ordered not done yet.\par Will schedule follow up with Dr Monroy \par \par Osteopenia\par DEXA 10/22\par Bone density shows osteopenia, some bone loss is seen.\par Resistance exercise diet rich in calcium and adequate Vitamin D supplements \par Vit D 5000 IU daily\par \par Obesity \par Gained 3 lbs\par aware of implications of obesity.  Patient motivated to lose weight.\par Advised to reduce portions.\par regular exercise routine, states she does an hour 2 x a week of the chair exercises\par Check weight weekly\par Follow up in 3-4 months.\par Decrease portions.\par \par Hypertension blood pressure stable on losartan hydrochlorothiazide.  Advised to lose weight low-salt diet and regular exercise routine.\par \par Hyperlipidemia on Simvastatin.\par \par Cataracts monitored by ophthalmologist.\par \par Follow up 3 months\par

## 2023-07-20 NOTE — HISTORY OF PRESENT ILLNESS
[FreeTextEntry1] : Follow up [de-identified] : Ms. ROBERTO MONTERO is a 72 year old female presenting for a follow up.\par States she was seen by Nurse at home will send a report \par Was concerned about curvature of spine\par States I will get a report.\par has been having tingling in her toes for months. Thinks it is arthritis.\par Hearing loss was referred to audiology 1/2023 but did not go. States she is concerned and will schedule.\par States she started taking 5000 IU for low vitamin D\par Seen by Dr Monroy adrenal nodule seen on MRI.\par Small cyst and angiomyolipoma renal\par Had an MRI recently to follow up on this.\par Colonoscopy  March 2023. Dr Olson put her on Omeprazole.\par Seen by Eye doctor cataract getting worse\par Hypertension on Losartan and HCTZ.\par Hyperlipidemia on simvastatin .\par Obesity weight has increased since her last visit.  She leads a very sedentary lifestyle and diet is poor\par Seen by Endo Dr Abdullahi Adrenal nodule has not follow up MRI 6/21\par October appointment cancelled because he retired.\par \par Patient was hospitalized with Covid in December.  All symptoms resolved.\par History of GERD taking PPI was told she had an ulcer on Endoscopy.\par HTN on Losartan and HCTZ\par HLD on Simvastatin

## 2023-07-27 ENCOUNTER — TRANSCRIPTION ENCOUNTER (OUTPATIENT)
Age: 73
End: 2023-07-27

## 2023-07-27 LAB
ALBUMIN SERPL ELPH-MCNC: 4.2 G/DL
ALP BLD-CCNC: 90 U/L
ALT SERPL-CCNC: 10 U/L
ANION GAP SERPL CALC-SCNC: 16 MMOL/L
AST SERPL-CCNC: 14 U/L
BILIRUB SERPL-MCNC: 0.2 MG/DL
BUN SERPL-MCNC: 17 MG/DL
CALCIUM SERPL-MCNC: 9.5 MG/DL
CHLORIDE SERPL-SCNC: 108 MMOL/L
CHOLEST SERPL-MCNC: 138 MG/DL
CO2 SERPL-SCNC: 22 MMOL/L
CREAT SERPL-MCNC: 0.82 MG/DL
EGFR: 76 ML/MIN/1.73M2
ESTIMATED AVERAGE GLUCOSE: 123 MG/DL
GLUCOSE SERPL-MCNC: 86 MG/DL
HBA1C MFR BLD HPLC: 5.9 %
HDLC SERPL-MCNC: 45 MG/DL
LDLC SERPL CALC-MCNC: 74 MG/DL
NONHDLC SERPL-MCNC: 93 MG/DL
POTASSIUM SERPL-SCNC: 4.2 MMOL/L
PROT SERPL-MCNC: 6.3 G/DL
SODIUM SERPL-SCNC: 146 MMOL/L
TRIGL SERPL-MCNC: 107 MG/DL
TSH SERPL-ACNC: 1.31 UIU/ML

## 2023-07-31 LAB
ALBUMIN SERPL ELPH-MCNC: 4 G/DL
ALDOSTERONE SERUM: 14.2 NG/DL
ALP BLD-CCNC: 87 U/L
ALT SERPL-CCNC: 12 U/L
ANION GAP SERPL CALC-SCNC: 14 MMOL/L
AST SERPL-CCNC: 15 U/L
BILIRUB SERPL-MCNC: 0.3 MG/DL
BUN SERPL-MCNC: 22 MG/DL
CALCIUM SERPL-MCNC: 9.1 MG/DL
CHLORIDE SERPL-SCNC: 105 MMOL/L
CO2 SERPL-SCNC: 22 MMOL/L
CORTIS SERPL-MCNC: 11.7 UG/DL
CREAT SERPL-MCNC: 0.72 MG/DL
EGFR: 89 ML/MIN/1.73M2
GLUCOSE SERPL-MCNC: 107 MG/DL
POTASSIUM SERPL-SCNC: 3.6 MMOL/L
PROT SERPL-MCNC: 6.5 G/DL
SODIUM SERPL-SCNC: 141 MMOL/L

## 2023-08-03 LAB — RENIN ACTIVITY, PLASMA: 3 NG/ML/HR

## 2023-08-04 LAB — DHEA-SULFATE, SERUM: <10 UG/DL

## 2023-08-10 ENCOUNTER — OFFICE (OUTPATIENT)
Dept: URBAN - METROPOLITAN AREA CLINIC 12 | Facility: CLINIC | Age: 73
Setting detail: OPHTHALMOLOGY
End: 2023-08-10
Payer: MEDICARE

## 2023-08-10 DIAGNOSIS — H43.393: ICD-10-CM

## 2023-08-10 DIAGNOSIS — H25.13: ICD-10-CM

## 2023-08-10 DIAGNOSIS — H35.033: ICD-10-CM

## 2023-08-10 DIAGNOSIS — H35.373: ICD-10-CM

## 2023-08-10 PROCEDURE — 92134 CPTRZ OPH DX IMG PST SGM RTA: CPT | Performed by: OPHTHALMOLOGY

## 2023-08-10 PROCEDURE — 99214 OFFICE O/P EST MOD 30 MIN: CPT | Performed by: OPHTHALMOLOGY

## 2023-08-10 ASSESSMENT — REFRACTION_CURRENTRX
OS_OVR_VA: 20/
OS_CYLINDER: -0.50
OS_VPRISM_DIRECTION: SV
OD_SPHERE: +2.50
OS_SPHERE: +2.50
OD_OVR_VA: 20/
OS_VPRISM_DIRECTION: SV
OD_VPRISM_DIRECTION: SV
OD_SPHERE: +1.50
OD_OVR_VA: 20/
OD_VPRISM_DIRECTION: SV
OS_AXIS: 097
OS_OVR_VA: 20/
OD_AXIS: 030
OS_SPHERE: +1.25
OD_CYLINDER: -0.50
OD_AXIS: 18
OD_CYLINDER: -0.75
OS_CYLINDER: SPHERE

## 2023-08-10 ASSESSMENT — REFRACTION_AUTOREFRACTION
OD_AXIS: 056
OS_AXIS: 142
OS_CYLINDER: -0.25
OS_SPHERE: +1.50
OD_SPHERE: +2.00
OD_CYLINDER: -0.25

## 2023-08-10 ASSESSMENT — REFRACTION_MANIFEST
OS_ADD: +2.50
OS_AXIS: 140
OS_CYLINDER: -0.25
OS_SPHERE: +1.50
OD_VA1: 20/40-1
OD_AXIS: 055
OD_ADD: +2.50
OD_SPHERE: +2.00
OD_CYLINDER: -0.25
OS_VA1: 20/40-2

## 2023-08-10 ASSESSMENT — AXIALLENGTH_DERIVED
OS_AL: 22.1412
OS_AL: 22.1412
OD_AL: 22.1313
OD_AL: 22.1313

## 2023-08-10 ASSESSMENT — KERATOMETRY
METHOD_AUTO_MANUAL: AUTO
OS_K1POWER_DIOPTERS: 45.50
OD_K2POWER_DIOPTERS: 46.00
OD_K1POWER_DIOPTERS: 45.50
OD_AXISANGLE_DEGREES: 112
OS_K2POWER_DIOPTERS: 47.00
OS_AXISANGLE_DEGREES: 065

## 2023-08-10 ASSESSMENT — SPHEQUIV_DERIVED
OS_SPHEQUIV: 1.375
OD_SPHEQUIV: 1.875
OS_SPHEQUIV: 1.375
OD_SPHEQUIV: 1.875

## 2023-08-10 ASSESSMENT — SUPERFICIAL PUNCTATE KERATITIS (SPK)
OS_SPK: T
OD_SPK: T

## 2023-08-10 ASSESSMENT — CONFRONTATIONAL VISUAL FIELD TEST (CVF)
OS_FINDINGS: FULL
OD_FINDINGS: FULL

## 2023-08-10 ASSESSMENT — VISUAL ACUITY
OD_BCVA: 20/60
OS_BCVA: 20/60-2

## 2023-08-10 ASSESSMENT — LID POSITION - DERMATOCHALASIS: OD_DERMATOCHALASIS: T 1+

## 2023-08-10 ASSESSMENT — TONOMETRY
OD_IOP_MMHG: 18
OS_IOP_MMHG: 17

## 2023-08-16 LAB
METANEPH 24H UR-MRATE: 41 UG/24 HR
METANEPHS 24H UR-MCNC: 14 UG/L
NORMETANEPHRINE 24 HR URINE: 284 UG/24 HR
NORMETANEPHRINE 24H UR-MCNC: 98 UG/L

## 2023-08-18 LAB
CORTIS 24H UR-MCNC: 24 H
CORTIS 24H UR-MRATE: 5.3 MCG/24 H
DOPAMINE UR-MCNC: 167 UG/L
DOPAMINE, UR, 24HR: 100 UG/24 HR
EPINEPH UR-MCNC: <1 UG/L
EPINEPHRINE, U, 24HR: 0 UG/24 HR
NOREPINEPH UR-MCNC: 31 UG/L
NOREPINEPHRINE,U,24H: 19 UG/24 HR
SPECIMEN VOL 24H UR: 600 ML

## 2023-08-27 ENCOUNTER — OFFICE (OUTPATIENT)
Dept: URBAN - METROPOLITAN AREA CLINIC 12 | Facility: CLINIC | Age: 73
Setting detail: OPHTHALMOLOGY
End: 2023-08-27
Payer: MEDICARE

## 2023-08-27 DIAGNOSIS — H25.13: ICD-10-CM

## 2023-08-27 DIAGNOSIS — H25.11: ICD-10-CM

## 2023-08-27 PROCEDURE — 92136 OPHTHALMIC BIOMETRY: CPT | Performed by: OPHTHALMOLOGY

## 2023-08-27 PROCEDURE — 99213 OFFICE O/P EST LOW 20 MIN: CPT | Performed by: OPHTHALMOLOGY

## 2023-08-27 ASSESSMENT — REFRACTION_CURRENTRX
OD_SPHERE: +2.50
OS_OVR_VA: 20/
OD_VPRISM_DIRECTION: SV
OD_CYLINDER: -0.50
OD_OVR_VA: 20/
OD_SPHERE: +1.50
OD_VPRISM_DIRECTION: SV
OS_CYLINDER: -0.50
OS_VPRISM_DIRECTION: SV
OS_SPHERE: +2.50
OD_AXIS: 18
OS_CYLINDER: SPHERE
OS_AXIS: 097
OS_VPRISM_DIRECTION: SV
OD_OVR_VA: 20/
OD_AXIS: 030
OS_OVR_VA: 20/
OS_SPHERE: +1.25
OD_CYLINDER: -0.75

## 2023-08-27 ASSESSMENT — KERATOMETRY
OS_AXISANGLE_DEGREES: 063
OS_K1POWER_DIOPTERS: 46.00
OD_K2POWER_DIOPTERS: 45.75
OD_K1POWER_DIOPTERS: 45.75
OS_K2POWER_DIOPTERS: 46.75
OD_AXISANGLE_DEGREES: 090
METHOD_AUTO_MANUAL: AUTO

## 2023-08-27 ASSESSMENT — SPHEQUIV_DERIVED
OD_SPHEQUIV: 1.875
OS_SPHEQUIV: 1.375
OS_SPHEQUIV: 1.375
OD_SPHEQUIV: 2.125

## 2023-08-27 ASSESSMENT — CONFRONTATIONAL VISUAL FIELD TEST (CVF)
OS_FINDINGS: FULL
OD_FINDINGS: FULL

## 2023-08-27 ASSESSMENT — TONOMETRY
OD_IOP_MMHG: 16
OS_IOP_MMHG: 13

## 2023-08-27 ASSESSMENT — REFRACTION_MANIFEST
OD_CYLINDER: -0.25
OD_SPHERE: +2.00
OS_ADD: +2.50
OD_VA1: 20/40-1
OS_AXIS: 140
OS_CYLINDER: -0.25
OS_SPHERE: +1.50
OD_AXIS: 055
OS_VA1: 20/40-2
OD_ADD: +2.50

## 2023-08-27 ASSESSMENT — REFRACTION_AUTOREFRACTION
OS_AXIS: 121
OD_CYLINDER: -1.25
OD_AXIS: 067
OS_CYLINDER: -0.25
OS_SPHERE: +1.50
OD_SPHERE: +2.75

## 2023-08-27 ASSESSMENT — AXIALLENGTH_DERIVED
OS_AL: 22.1008
OD_AL: 22.046
OS_AL: 22.1008
OD_AL: 22.1313

## 2023-08-27 ASSESSMENT — SUPERFICIAL PUNCTATE KERATITIS (SPK)
OS_SPK: T
OD_SPK: T

## 2023-08-27 ASSESSMENT — VISUAL ACUITY
OS_BCVA: 20/60-1
OD_BCVA: 20/50-1

## 2023-08-27 ASSESSMENT — LID POSITION - DERMATOCHALASIS: OD_DERMATOCHALASIS: T 1+

## 2023-09-12 ENCOUNTER — APPOINTMENT (OUTPATIENT)
Dept: INTERNAL MEDICINE | Facility: CLINIC | Age: 73
End: 2023-09-12
Payer: MEDICARE

## 2023-09-12 VITALS
SYSTOLIC BLOOD PRESSURE: 130 MMHG | WEIGHT: 216 LBS | DIASTOLIC BLOOD PRESSURE: 70 MMHG | HEIGHT: 61 IN | OXYGEN SATURATION: 98 % | BODY MASS INDEX: 40.78 KG/M2 | RESPIRATION RATE: 16 BRPM | TEMPERATURE: 97.6 F | HEART RATE: 83 BPM

## 2023-09-12 DIAGNOSIS — Z01.818 ENCOUNTER FOR OTHER PREPROCEDURAL EXAMINATION: ICD-10-CM

## 2023-09-12 PROCEDURE — 99213 OFFICE O/P EST LOW 20 MIN: CPT

## 2023-09-15 ENCOUNTER — ASC (OUTPATIENT)
Dept: URBAN - METROPOLITAN AREA SURGERY 8 | Facility: SURGERY | Age: 73
Setting detail: OPHTHALMOLOGY
End: 2023-09-15
Payer: MEDICARE

## 2023-09-15 DIAGNOSIS — H25.11: ICD-10-CM

## 2023-09-15 PROCEDURE — 66984 XCAPSL CTRC RMVL W/O ECP: CPT | Performed by: OPHTHALMOLOGY

## 2023-09-16 ENCOUNTER — RX ONLY (RX ONLY)
Age: 73
End: 2023-09-16

## 2023-09-16 ENCOUNTER — OFFICE (OUTPATIENT)
Dept: URBAN - METROPOLITAN AREA CLINIC 12 | Facility: CLINIC | Age: 73
Setting detail: OPHTHALMOLOGY
End: 2023-09-16
Payer: MEDICARE

## 2023-09-16 DIAGNOSIS — Z96.1: ICD-10-CM

## 2023-09-16 PROCEDURE — 99024 POSTOP FOLLOW-UP VISIT: CPT | Performed by: OPTOMETRIST

## 2023-09-16 ASSESSMENT — REFRACTION_CURRENTRX
OS_VPRISM_DIRECTION: SV
OD_OVR_VA: 20/
OD_CYLINDER: -0.50
OD_SPHERE: +1.50
OD_VPRISM_DIRECTION: SV
OS_OVR_VA: 20/
OS_SPHERE: +1.25
OS_CYLINDER: -0.50
OD_OVR_VA: 20/
OS_SPHERE: +2.50
OD_AXIS: 18
OD_CYLINDER: -0.75
OS_VPRISM_DIRECTION: SV
OS_OVR_VA: 20/
OD_SPHERE: +2.50
OS_AXIS: 097
OS_CYLINDER: SPHERE
OD_AXIS: 030
OD_VPRISM_DIRECTION: SV

## 2023-09-16 ASSESSMENT — TONOMETRY
OS_IOP_MMHG: 17
OD_IOP_MMHG: 20

## 2023-09-16 ASSESSMENT — REFRACTION_MANIFEST
OD_VA1: 20/40-1
OS_CYLINDER: -0.25
OD_CYLINDER: -0.25
OD_ADD: +2.50
OS_ADD: +2.50
OS_SPHERE: +1.50
OD_AXIS: 055
OS_AXIS: 140
OD_SPHERE: +2.00
OS_VA1: 20/40-2

## 2023-09-16 ASSESSMENT — SPHEQUIV_DERIVED
OD_SPHEQUIV: 1.875
OS_SPHEQUIV: 1.25
OD_SPHEQUIV: 0.25
OS_SPHEQUIV: 1.375

## 2023-09-16 ASSESSMENT — VISUAL ACUITY
OD_BCVA: 20/50-
OS_BCVA: 20/40

## 2023-09-16 ASSESSMENT — CONFRONTATIONAL VISUAL FIELD TEST (CVF)
OD_FINDINGS: FULL
OS_FINDINGS: FULL

## 2023-09-16 ASSESSMENT — KERATOMETRY
OD_K1POWER_DIOPTERS: 45.50
METHOD_AUTO_MANUAL: AUTO
OS_K2POWER_DIOPTERS: 46.50
OS_AXISANGLE_DEGREES: 145
OD_AXISANGLE_DEGREES: 014
OD_K2POWER_DIOPTERS: 46.25
OS_K1POWER_DIOPTERS: 46.00

## 2023-09-16 ASSESSMENT — AXIALLENGTH_DERIVED
OS_AL: 22.1841
OD_AL: 22.6604
OS_AL: 22.1412
OD_AL: 22.091

## 2023-09-16 ASSESSMENT — REFRACTION_AUTOREFRACTION
OD_AXIS: 049
OD_CYLINDER: -0.50
OS_SPHERE: +1.50
OS_AXIS: 110
OD_SPHERE: +0.50
OS_CYLINDER: -0.50

## 2023-09-16 ASSESSMENT — LID POSITION - DERMATOCHALASIS: OD_DERMATOCHALASIS: T 1+

## 2023-09-16 ASSESSMENT — SUPERFICIAL PUNCTATE KERATITIS (SPK)
OD_SPK: T
OS_SPK: T

## 2023-09-20 ENCOUNTER — OFFICE (OUTPATIENT)
Dept: URBAN - METROPOLITAN AREA CLINIC 12 | Facility: CLINIC | Age: 73
Setting detail: OPHTHALMOLOGY
End: 2023-09-20
Payer: MEDICARE

## 2023-09-20 DIAGNOSIS — H25.12: ICD-10-CM

## 2023-09-20 PROCEDURE — 92136 OPHTHALMIC BIOMETRY: CPT | Performed by: OPHTHALMOLOGY

## 2023-09-20 ASSESSMENT — REFRACTION_MANIFEST
OD_AXIS: 055
OD_ADD: +2.50
OD_SPHERE: +2.00
OS_VA1: 20/40-2
OS_ADD: +2.50
OS_CYLINDER: -0.25
OD_VA1: 20/40-1
OD_CYLINDER: -0.25
OS_SPHERE: +1.50
OS_AXIS: 140

## 2023-09-20 ASSESSMENT — VISUAL ACUITY
OD_BCVA: 20/50
OS_BCVA: 20/25-

## 2023-09-20 ASSESSMENT — AXIALLENGTH_DERIVED
OS_AL: 22.1841
OD_AL: 22.7481
OS_AL: 22.1412
OD_AL: 22.1313

## 2023-09-20 ASSESSMENT — SPHEQUIV_DERIVED
OD_SPHEQUIV: 0.125
OS_SPHEQUIV: 1.375
OS_SPHEQUIV: 1.25
OD_SPHEQUIV: 1.875

## 2023-09-20 ASSESSMENT — REFRACTION_CURRENTRX
OS_CYLINDER: -0.50
OD_CYLINDER: -0.50
OS_CYLINDER: -0.25
OD_CYLINDER: -0.75
OD_AXIS: 041
OS_SPHERE: +1.75
OS_OVR_VA: 20/
OD_VPRISM_DIRECTION: SV
OD_AXIS: 030
OS_VPRISM_DIRECTION: SV
OS_SPHERE: +2.50
OS_VPRISM_DIRECTION: SV
OD_VPRISM_DIRECTION: SV
OS_OVR_VA: 20/
OD_OVR_VA: 20/
OD_SPHERE: +2.50
OD_SPHERE: +1.75
OS_AXIS: 097
OS_AXIS: 010
OD_OVR_VA: 20/

## 2023-09-20 ASSESSMENT — REFRACTION_AUTOREFRACTION
OS_CYLINDER: -0.50
OD_SPHERE: +0.25
OD_CYLINDER: -0.25
OS_SPHERE: +1.50
OD_AXIS: 058
OS_AXIS: 104

## 2023-09-20 ASSESSMENT — SUPERFICIAL PUNCTATE KERATITIS (SPK)
OD_SPK: T
OS_SPK: T

## 2023-09-20 ASSESSMENT — CONFRONTATIONAL VISUAL FIELD TEST (CVF)
OS_FINDINGS: FULL
OD_FINDINGS: FULL

## 2023-09-20 ASSESSMENT — KERATOMETRY
OS_K1POWER_DIOPTERS: 46.00
OS_AXISANGLE_DEGREES: 059
OS_K2POWER_DIOPTERS: 46.50
METHOD_AUTO_MANUAL: AUTO
OD_K1POWER_DIOPTERS: 45.50
OD_K2POWER_DIOPTERS: 46.00
OD_AXISANGLE_DEGREES: 097

## 2023-09-20 ASSESSMENT — TONOMETRY
OS_IOP_MMHG: 20
OD_IOP_MMHG: 18

## 2023-09-20 ASSESSMENT — LID POSITION - DERMATOCHALASIS: OD_DERMATOCHALASIS: T 1+

## 2023-10-02 NOTE — REASON FOR VISIT
Patient: Jeannie Phelps    Procedure: Procedure(s):  COLONOSCOPY with biopsies       Anesthesia Type:  MAC    Note:  Disposition: Outpatient   Postop Pain Control: Uneventful            Sign Out: Well controlled pain   PONV: No   Neuro/Psych: Uneventful            Sign Out: Acceptable/Baseline neuro status   Airway/Respiratory: Uneventful            Sign Out: Acceptable/Baseline resp. status   CV/Hemodynamics: Uneventful            Sign Out: Acceptable CV status; No obvious hypovolemia; No obvious fluid overload   Other NRE: NONE   DID A NON-ROUTINE EVENT OCCUR? No       Last vitals:  Vitals Value Taken Time   BP     Temp     Pulse     Resp     SpO2 100 % 10/02/23 1556   Vitals shown include unvalidated device data.    Electronically Signed By: AILEEN Awad CRNA  October 2, 2023  3:57 PM   [Consultation] : a consultation visit [Adrenal Evaluation/Adrenal Disorder] : adrenal evaluation/adrenal disorder

## 2023-10-06 ENCOUNTER — ASC (OUTPATIENT)
Dept: URBAN - METROPOLITAN AREA SURGERY 8 | Facility: SURGERY | Age: 73
Setting detail: OPHTHALMOLOGY
End: 2023-10-06
Payer: MEDICARE

## 2023-10-06 DIAGNOSIS — H25.12: ICD-10-CM

## 2023-10-06 PROCEDURE — 66984 XCAPSL CTRC RMVL W/O ECP: CPT | Mod: 79,LT | Performed by: OPHTHALMOLOGY

## 2023-10-07 ENCOUNTER — RX ONLY (RX ONLY)
Age: 73
End: 2023-10-07

## 2023-10-07 ENCOUNTER — OFFICE (OUTPATIENT)
Dept: URBAN - METROPOLITAN AREA CLINIC 12 | Facility: CLINIC | Age: 73
Setting detail: OPHTHALMOLOGY
End: 2023-10-07
Payer: MEDICARE

## 2023-10-07 DIAGNOSIS — Z96.1: ICD-10-CM

## 2023-10-07 PROCEDURE — 99024 POSTOP FOLLOW-UP VISIT: CPT | Performed by: OPTOMETRIST

## 2023-10-07 ASSESSMENT — REFRACTION_CURRENTRX
OS_CYLINDER: -0.25
OS_CYLINDER: -0.50
OD_AXIS: 041
OS_VPRISM_DIRECTION: SV
OS_AXIS: 097
OS_SPHERE: +2.50
OD_SPHERE: +1.75
OD_CYLINDER: -0.75
OD_OVR_VA: 20/
OD_VPRISM_DIRECTION: SV
OD_OVR_VA: 20/
OD_SPHERE: +2.50
OS_VPRISM_DIRECTION: SV
OD_CYLINDER: -0.50
OD_VPRISM_DIRECTION: SV
OS_AXIS: 010
OS_OVR_VA: 20/
OD_AXIS: 030
OS_SPHERE: +1.75
OS_OVR_VA: 20/

## 2023-10-07 ASSESSMENT — REFRACTION_MANIFEST
OD_VA1: 20/40-1
OD_ADD: +2.50
OD_SPHERE: +2.00
OS_ADD: +2.50
OD_AXIS: 055
OS_AXIS: 140
OS_SPHERE: +1.50
OS_VA1: 20/40-2
OD_CYLINDER: -0.25
OS_CYLINDER: -0.25

## 2023-10-07 ASSESSMENT — SPHEQUIV_DERIVED
OS_SPHEQUIV: 1.375
OD_SPHEQUIV: 0.25
OS_SPHEQUIV: 0.625
OD_SPHEQUIV: 1.875

## 2023-10-07 ASSESSMENT — KERATOMETRY
OS_AXISANGLE_DEGREES: 070
METHOD_AUTO_MANUAL: AUTO
OS_K1POWER_DIOPTERS: 45.75
OS_K2POWER_DIOPTERS: 46.50
OD_AXISANGLE_DEGREES: 121
OD_K2POWER_DIOPTERS: 45.75
OD_K1POWER_DIOPTERS: 45.50

## 2023-10-07 ASSESSMENT — AXIALLENGTH_DERIVED
OS_AL: 22.1817
OD_AL: 22.1718
OD_AL: 22.7455
OS_AL: 22.443

## 2023-10-07 ASSESSMENT — TONOMETRY: OD_IOP_MMHG: 14

## 2023-10-07 ASSESSMENT — VISUAL ACUITY
OD_BCVA: 20/40+2
OS_BCVA: 20/30

## 2023-10-07 ASSESSMENT — SUPERFICIAL PUNCTATE KERATITIS (SPK)
OS_SPK: T
OD_SPK: T

## 2023-10-07 ASSESSMENT — REFRACTION_AUTOREFRACTION
OS_SPHERE: +0.75
OD_AXIS: 057
OD_SPHERE: +0.50
OD_CYLINDER: -0.50
OS_CYLINDER: -0.25
OS_AXIS: 154

## 2023-10-07 ASSESSMENT — CONFRONTATIONAL VISUAL FIELD TEST (CVF)
OS_FINDINGS: FULL
OD_FINDINGS: FULL

## 2023-10-07 ASSESSMENT — LID POSITION - DERMATOCHALASIS: OD_DERMATOCHALASIS: T 1+

## 2023-10-12 ENCOUNTER — OFFICE (OUTPATIENT)
Dept: URBAN - METROPOLITAN AREA CLINIC 12 | Facility: CLINIC | Age: 73
Setting detail: OPHTHALMOLOGY
End: 2023-10-12
Payer: MEDICARE

## 2023-10-12 DIAGNOSIS — Z96.1: ICD-10-CM

## 2023-10-12 PROCEDURE — 99024 POSTOP FOLLOW-UP VISIT: CPT | Performed by: OPTOMETRIST

## 2023-10-12 ASSESSMENT — SPHEQUIV_DERIVED
OS_SPHEQUIV: 0.125
OS_SPHEQUIV: 1.375
OD_SPHEQUIV: 1.875
OD_SPHEQUIV: 0.25

## 2023-10-12 ASSESSMENT — REFRACTION_AUTOREFRACTION
OS_SPHERE: +0.25
OD_CYLINDER: -0.50
OD_AXIS: 071
OS_CYLINDER: -0.25
OS_AXIS: 122
OD_SPHERE: +0.50

## 2023-10-12 ASSESSMENT — KERATOMETRY
OS_AXISANGLE_DEGREES: 066
OD_K2POWER_DIOPTERS: 46.00
OS_K2POWER_DIOPTERS: 46.50
OD_K1POWER_DIOPTERS: 46.00
OS_K1POWER_DIOPTERS: 46.25
OD_AXISANGLE_DEGREES: 090
METHOD_AUTO_MANUAL: AUTO

## 2023-10-12 ASSESSMENT — REFRACTION_CURRENTRX
OS_VPRISM_DIRECTION: SV
OS_AXIS: 097
OS_CYLINDER: -0.50
OD_AXIS: 030
OS_SPHERE: +2.50
OD_OVR_VA: 20/
OS_OVR_VA: 20/
OS_SPHERE: +1.75
OD_SPHERE: +2.50
OS_AXIS: 010
OD_AXIS: 041
OD_CYLINDER: -0.50
OD_VPRISM_DIRECTION: SV
OD_CYLINDER: -0.75
OD_SPHERE: +1.75
OD_OVR_VA: 20/
OD_VPRISM_DIRECTION: SV
OS_OVR_VA: 20/
OS_CYLINDER: -0.25
OS_VPRISM_DIRECTION: SV

## 2023-10-12 ASSESSMENT — TONOMETRY
OD_IOP_MMHG: 14
OS_IOP_MMHG: 16

## 2023-10-12 ASSESSMENT — CONFRONTATIONAL VISUAL FIELD TEST (CVF)
OS_FINDINGS: FULL
OD_FINDINGS: FULL

## 2023-10-12 ASSESSMENT — REFRACTION_MANIFEST
OD_ADD: +2.50
OS_VA1: 20/40-2
OS_AXIS: 140
OS_CYLINDER: -0.25
OD_AXIS: 055
OD_VA1: 20/40-1
OD_SPHERE: +2.00
OS_ADD: +2.50
OD_CYLINDER: -0.25
OS_SPHERE: +1.50

## 2023-10-12 ASSESSMENT — AXIALLENGTH_DERIVED
OD_AL: 22.0508
OS_AL: 22.1008
OD_AL: 22.6182
OS_AL: 22.5366

## 2023-10-12 ASSESSMENT — VISUAL ACUITY
OD_BCVA: 20/30+
OS_BCVA: 20/30

## 2023-10-12 ASSESSMENT — SUPERFICIAL PUNCTATE KERATITIS (SPK)
OS_SPK: T
OD_SPK: T

## 2023-10-12 ASSESSMENT — LID POSITION - DERMATOCHALASIS: OD_DERMATOCHALASIS: T 1+

## 2023-10-25 ENCOUNTER — RESULT REVIEW (OUTPATIENT)
Age: 73
End: 2023-10-25

## 2023-10-25 ENCOUNTER — APPOINTMENT (OUTPATIENT)
Dept: MAMMOGRAPHY | Facility: CLINIC | Age: 73
End: 2023-10-25
Payer: MEDICARE

## 2023-10-25 ENCOUNTER — OUTPATIENT (OUTPATIENT)
Dept: OUTPATIENT SERVICES | Facility: HOSPITAL | Age: 73
LOS: 1 days | End: 2023-10-25
Payer: COMMERCIAL

## 2023-10-25 DIAGNOSIS — Z00.8 ENCOUNTER FOR OTHER GENERAL EXAMINATION: ICD-10-CM

## 2023-10-25 DIAGNOSIS — Z98.890 OTHER SPECIFIED POSTPROCEDURAL STATES: Chronic | ICD-10-CM

## 2023-10-25 DIAGNOSIS — Z12.39 ENCOUNTER FOR OTHER SCREENING FOR MALIGNANT NEOPLASM OF BREAST: ICD-10-CM

## 2023-10-25 PROCEDURE — 77063 BREAST TOMOSYNTHESIS BI: CPT | Mod: 26

## 2023-10-25 PROCEDURE — 77067 SCR MAMMO BI INCL CAD: CPT | Mod: 26

## 2023-10-25 PROCEDURE — 77067 SCR MAMMO BI INCL CAD: CPT

## 2023-10-25 PROCEDURE — 77063 BREAST TOMOSYNTHESIS BI: CPT

## 2023-10-26 ENCOUNTER — APPOINTMENT (OUTPATIENT)
Dept: FAMILY MEDICINE | Facility: CLINIC | Age: 73
End: 2023-10-26
Payer: MEDICARE

## 2023-10-26 VITALS
TEMPERATURE: 97.8 F | HEART RATE: 79 BPM | WEIGHT: 212 LBS | SYSTOLIC BLOOD PRESSURE: 137 MMHG | DIASTOLIC BLOOD PRESSURE: 83 MMHG | HEIGHT: 61 IN | RESPIRATION RATE: 16 BRPM | OXYGEN SATURATION: 98 % | BODY MASS INDEX: 40.02 KG/M2

## 2023-10-26 DIAGNOSIS — Z23 ENCOUNTER FOR IMMUNIZATION: ICD-10-CM

## 2023-10-26 PROCEDURE — 99214 OFFICE O/P EST MOD 30 MIN: CPT | Mod: 25

## 2023-10-26 PROCEDURE — G0008: CPT

## 2023-10-26 PROCEDURE — 90662 IIV NO PRSV INCREASED AG IM: CPT

## 2023-10-30 ENCOUNTER — APPOINTMENT (OUTPATIENT)
Dept: PULMONOLOGY | Facility: CLINIC | Age: 73
End: 2023-10-30
Payer: MEDICARE

## 2023-10-30 VITALS
BODY MASS INDEX: 40.02 KG/M2 | SYSTOLIC BLOOD PRESSURE: 128 MMHG | HEART RATE: 83 BPM | DIASTOLIC BLOOD PRESSURE: 80 MMHG | RESPIRATION RATE: 16 BRPM | HEIGHT: 61 IN | OXYGEN SATURATION: 96 % | WEIGHT: 212 LBS

## 2023-10-30 DIAGNOSIS — Z87.898 PERSONAL HISTORY OF OTHER SPECIFIED CONDITIONS: ICD-10-CM

## 2023-10-30 DIAGNOSIS — R79.89 OTHER SPECIFIED ABNORMAL FINDINGS OF BLOOD CHEMISTRY: ICD-10-CM

## 2023-10-30 PROCEDURE — 99204 OFFICE O/P NEW MOD 45 MIN: CPT

## 2023-11-02 ENCOUNTER — APPOINTMENT (OUTPATIENT)
Dept: CARDIOLOGY | Facility: CLINIC | Age: 73
End: 2023-11-02
Payer: MEDICARE

## 2023-11-02 VITALS — SYSTOLIC BLOOD PRESSURE: 128 MMHG | OXYGEN SATURATION: 97 % | HEART RATE: 76 BPM | DIASTOLIC BLOOD PRESSURE: 76 MMHG

## 2023-11-02 VITALS — WEIGHT: 212 LBS | BODY MASS INDEX: 40.06 KG/M2

## 2023-11-02 DIAGNOSIS — R60.0 LOCALIZED EDEMA: ICD-10-CM

## 2023-11-02 PROCEDURE — 93000 ELECTROCARDIOGRAM COMPLETE: CPT

## 2023-11-02 PROCEDURE — 99205 OFFICE O/P NEW HI 60 MIN: CPT

## 2023-11-03 ENCOUNTER — OFFICE (OUTPATIENT)
Dept: URBAN - METROPOLITAN AREA CLINIC 12 | Facility: CLINIC | Age: 73
Setting detail: OPHTHALMOLOGY
End: 2023-11-03
Payer: MEDICARE

## 2023-11-03 DIAGNOSIS — Z96.1: ICD-10-CM

## 2023-11-03 PROCEDURE — 99024 POSTOP FOLLOW-UP VISIT: CPT | Performed by: OPTOMETRIST

## 2023-11-03 ASSESSMENT — REFRACTION_MANIFEST
OS_SPHERE: PLANO
OD_ADD: +2.50
OD_VA1: 20/40-1
OD_SPHERE: PLANO
OD_AXIS: 050
OD_ADD: +2.50
OD_SPHERE: +2.00
OS_VA1: 20/40-2
OD_CYLINDER: -0.25
OS_ADD: +2.50
OD_VA1: 20/25-
OS_ADD: +2.50
OD_CYLINDER: -0.25
OS_CYLINDER: -0.25
OS_SPHERE: +1.50
OS_AXIS: 140
OS_VA1: 20/25-
OS_CYLINDER: SPHERE
OD_AXIS: 055

## 2023-11-03 ASSESSMENT — REFRACTION_CURRENTRX
OD_CYLINDER: -0.75
OD_SPHERE: +2.50
OS_VPRISM_DIRECTION: SV
OD_AXIS: 030
OS_AXIS: 097
OD_AXIS: 041
OD_CYLINDER: -0.50
OD_OVR_VA: 20/
OS_SPHERE: +1.75
OD_VPRISM_DIRECTION: SV
OS_AXIS: 010
OD_VPRISM_DIRECTION: SV
OD_OVR_VA: 20/
OD_SPHERE: +1.75
OS_CYLINDER: -0.25
OS_VPRISM_DIRECTION: SV
OS_OVR_VA: 20/
OS_OVR_VA: 20/
OS_SPHERE: +2.50
OS_CYLINDER: -0.50

## 2023-11-03 ASSESSMENT — SUPERFICIAL PUNCTATE KERATITIS (SPK)
OD_SPK: T
OS_SPK: T

## 2023-11-03 ASSESSMENT — REFRACTION_AUTOREFRACTION
OD_SPHERE: +0.25
OS_SPHERE: +0.25
OD_CYLINDER: -0.25
OD_AXIS: 049
OS_AXIS: 108
OS_CYLINDER: -0.25

## 2023-11-03 ASSESSMENT — SPHEQUIV_DERIVED
OD_SPHEQUIV: 0.125
OS_SPHEQUIV: 0.125
OD_SPHEQUIV: 1.875
OS_SPHEQUIV: 1.375

## 2023-11-03 ASSESSMENT — LID POSITION - DERMATOCHALASIS: OD_DERMATOCHALASIS: T 1+

## 2023-11-03 ASSESSMENT — CONFRONTATIONAL VISUAL FIELD TEST (CVF)
OD_FINDINGS: FULL
OS_FINDINGS: FULL

## 2023-11-09 ENCOUNTER — APPOINTMENT (OUTPATIENT)
Dept: CT IMAGING | Facility: CLINIC | Age: 73
End: 2023-11-09
Payer: MEDICARE

## 2023-11-09 ENCOUNTER — OUTPATIENT (OUTPATIENT)
Dept: OUTPATIENT SERVICES | Facility: HOSPITAL | Age: 73
LOS: 1 days | End: 2023-11-09
Payer: COMMERCIAL

## 2023-11-09 DIAGNOSIS — Z98.890 OTHER SPECIFIED POSTPROCEDURAL STATES: Chronic | ICD-10-CM

## 2023-11-09 DIAGNOSIS — K44.9 DIAPHRAGMATIC HERNIA WITHOUT OBSTRUCTION OR GANGRENE: ICD-10-CM

## 2023-11-09 DIAGNOSIS — R05.3 CHRONIC COUGH: ICD-10-CM

## 2023-11-09 PROCEDURE — 71250 CT THORAX DX C-: CPT | Mod: 26

## 2023-11-09 PROCEDURE — 71250 CT THORAX DX C-: CPT

## 2023-12-06 ENCOUNTER — APPOINTMENT (OUTPATIENT)
Dept: NEUROLOGY | Facility: CLINIC | Age: 73
End: 2023-12-06

## 2023-12-07 ENCOUNTER — APPOINTMENT (OUTPATIENT)
Dept: CARDIOLOGY | Facility: CLINIC | Age: 73
End: 2023-12-07
Payer: MEDICARE

## 2023-12-07 PROCEDURE — 93306 TTE W/DOPPLER COMPLETE: CPT

## 2023-12-27 ENCOUNTER — APPOINTMENT (OUTPATIENT)
Dept: CARDIOLOGY | Facility: CLINIC | Age: 73
End: 2023-12-27

## 2024-01-02 ENCOUNTER — OUTPATIENT (OUTPATIENT)
Dept: OUTPATIENT SERVICES | Facility: HOSPITAL | Age: 74
LOS: 1 days | End: 2024-01-02
Payer: COMMERCIAL

## 2024-01-02 DIAGNOSIS — G47.33 OBSTRUCTIVE SLEEP APNEA (ADULT) (PEDIATRIC): ICD-10-CM

## 2024-01-02 DIAGNOSIS — Z98.890 OTHER SPECIFIED POSTPROCEDURAL STATES: Chronic | ICD-10-CM

## 2024-01-02 PROCEDURE — 95810 POLYSOM 6/> YRS 4/> PARAM: CPT | Mod: 26

## 2024-01-02 PROCEDURE — 95810 POLYSOM 6/> YRS 4/> PARAM: CPT

## 2024-01-16 ENCOUNTER — RESULT CHARGE (OUTPATIENT)
Age: 74
End: 2024-01-16

## 2024-01-17 ENCOUNTER — APPOINTMENT (OUTPATIENT)
Dept: PULMONOLOGY | Facility: CLINIC | Age: 74
End: 2024-01-17
Payer: MEDICARE

## 2024-01-17 VITALS
RESPIRATION RATE: 16 BRPM | DIASTOLIC BLOOD PRESSURE: 72 MMHG | OXYGEN SATURATION: 98 % | SYSTOLIC BLOOD PRESSURE: 126 MMHG | HEART RATE: 77 BPM

## 2024-01-17 VITALS — HEIGHT: 59 IN | WEIGHT: 204 LBS | BODY MASS INDEX: 41.12 KG/M2

## 2024-01-17 DIAGNOSIS — G47.30 HYPERSOMNIA, UNSPECIFIED: ICD-10-CM

## 2024-01-17 DIAGNOSIS — G47.10 HYPERSOMNIA, UNSPECIFIED: ICD-10-CM

## 2024-01-17 PROCEDURE — 94010 BREATHING CAPACITY TEST: CPT

## 2024-01-17 PROCEDURE — 99214 OFFICE O/P EST MOD 30 MIN: CPT | Mod: 25

## 2024-01-17 NOTE — PHYSICAL EXAM
[No Acute Distress] : no acute distress [Low Lying Soft Palate] : low lying soft palate [Elongated Uvula] : elongated uvula [Enlarged Base of the Tongue] : enlarged base of the tongue [III] : Mallampati Class: III [Supple] : supple [Normal Rate/Rhythm] : normal rate/rhythm [Normal S1, S2] : normal s1, s2 [No Resp Distress] : no resp distress [No Acc Muscle Use] : no acc muscle use [Normal Palpation] : normal palpation [Normal Rhythm and Effort] : normal rhythm and effort [Clear to Auscultation Bilaterally] : clear to auscultation bilaterally [Benign] : benign [Normal Color/ Pigmentation] : normal color/ pigmentation [Oriented x3] : oriented x3 [Normal Affect] : normal affect [TextBox_89] : obese

## 2024-01-17 NOTE — PLAN
[TextEntry] : CHUY wayne as recc. Gave info on Dr Gonzalez for reflux. ENT f/u. Flonase for now. F/U with cardio. Can repeat CT next year. Reviewed treatments for mild sleep apnea - reviewed CPAP as first line therapy; reviewed OA. Will try CPAP as APAP. Reviewed use of CPAP. Weight loss a must. Further reccs will come.

## 2024-01-17 NOTE — ASSESSMENT
[FreeTextEntry1] : Mild NATASHA with EDS; should be treated.  Cough seems more from nasal source +/- GERD. Clinically, not c/w asthma.

## 2024-01-17 NOTE — HISTORY OF PRESENT ILLNESS
[Former] : former [< 20 pack-years] : < 20 pack-years [Lab] : lab [TextBox_4] : Presents with a cough for about 9 years. Alot of throat mucous. Cough more at night. Worse when laying down; cannot lay flat b/c of cough.  No change.   Also with LOVING the past 6 years. Occ wheeze. SOB worse when flat. OK at rest when upright.    [place] done today 1/17/24 - she had difficulty with the maneuvers and could not do it to ATS standards but no abnormalities evident.   CT chest reviewed below; no abn to suggest SOB.   Hx GERD. Gets acid taste when she eats. CP when she eats as well. Reflux when laying down. Saw GI Dr Olson in [place]. Put on omeprazole; now taking only prn. Latest CXR done 3/2023 with ?HH.  Advised on last visit to see Dr Gonzalez; info given; has not gone yet.   Continuous nasal drip, nasal congestion. On flonase. Saw ENT in the past.   Saw Dr Carmichael for cardio; w/u in progress.   Saw cardio about 11 years ago.   Heavy snoring. EDS with ESS 9. PSG with mild NATASHA. Mild PLMS but no significant arousals.   BMI >40.   Stopped smoking at age 22.   Retired .  [TextBox_100] : 1/2/24 [TextBox_108] : 5.4 [ESS] : 9

## 2024-01-17 NOTE — REVIEW OF SYSTEMS
[Negative] : Hematologic [TextBox_3] : per HPI [TextBox_14] : per HPI [TextBox_30] : per HPI [TextBox_44] : per HPI [TextBox_57] : per HPI [TextBox_69] : per HPI [TextBox_144] : per HPI

## 2024-01-17 NOTE — PROCEDURE
[FreeTextEntry1] : cxr done 3/7/23: large HH.  tanisha done today 1/17/24 showed no obstruction  -------------- EXAM: [id number] - CT CHEST - ORDERED BY: KENNETH BHATT   PROCEDURE DATE: 11/09/2023    INTERPRETATION: INDICATION: Cough, abnormal chest x-ray  TECHNIQUE: Helical acquisition images of the chest without intravenous contrast. Maximum intensity projection images were generated.  COMPARISON: 11/30/2020 CT chest.  FINDINGS:  LUNGS/AIRWAYS/PLEURA: Patent trachea and bronchi. No bronchiectasis. Minimal groundglass and thin linear opacities in the right lower lobe, likely a combination of dependent atelectasis and scarring. No pleural effusion.  LYMPH NODES/MEDIASTINUM: No lymphadenopathy. Moderate hiatal hernia.  HEART/VASCULATURE: Normal heart size. No pericardial effusion. Coronary artery calcifications. Normal caliber aorta.  UPPER ABDOMEN: Unchanged liver lesion last characterized on MRI abdomen 4/8/2023.  BONES/SOFT TISSUES: Degenerative sclerosis in the lower thoracic spine. No acute fracture.   IMPRESSION:  Moderate hiatal hernia.  --- End of Report ---       KOLE HARRIS M.D., ATTENDING RADIOLOGIST This document has been electronically signed. Nov 17 2023 12:32PM   Notes

## 2024-01-31 ENCOUNTER — APPOINTMENT (OUTPATIENT)
Dept: CARDIOLOGY | Facility: CLINIC | Age: 74
End: 2024-01-31
Payer: MEDICARE

## 2024-01-31 PROCEDURE — 93351 STRESS TTE COMPLETE: CPT

## 2024-01-31 PROCEDURE — 93320 DOPPLER ECHO COMPLETE: CPT

## 2024-02-02 ENCOUNTER — NON-APPOINTMENT (OUTPATIENT)
Age: 74
End: 2024-02-02

## 2024-02-02 ENCOUNTER — APPOINTMENT (OUTPATIENT)
Dept: FAMILY MEDICINE | Facility: CLINIC | Age: 74
End: 2024-02-02
Payer: MEDICARE

## 2024-02-02 VITALS
HEIGHT: 59 IN | BODY MASS INDEX: 40.32 KG/M2 | TEMPERATURE: 97.8 F | OXYGEN SATURATION: 99 % | RESPIRATION RATE: 16 BRPM | DIASTOLIC BLOOD PRESSURE: 81 MMHG | HEART RATE: 78 BPM | SYSTOLIC BLOOD PRESSURE: 121 MMHG | WEIGHT: 200 LBS

## 2024-02-02 DIAGNOSIS — E66.9 OBESITY, UNSPECIFIED: ICD-10-CM

## 2024-02-02 DIAGNOSIS — K44.9 DIAPHRAGMATIC HERNIA W/OUT OBSTRUCTION OR GANGRENE: ICD-10-CM

## 2024-02-02 DIAGNOSIS — I10 ESSENTIAL (PRIMARY) HYPERTENSION: ICD-10-CM

## 2024-02-02 LAB
BASOPHILS # BLD AUTO: 0.06 K/UL
BASOPHILS NFR BLD AUTO: 0.7 %
EOSINOPHIL # BLD AUTO: 0.12 K/UL
EOSINOPHIL NFR BLD AUTO: 1.3 %
HCT VFR BLD CALC: 41.2 %
HGB BLD-MCNC: 13 G/DL
IMM GRANULOCYTES NFR BLD AUTO: 0.2 %
LYMPHOCYTES # BLD AUTO: 2.36 K/UL
LYMPHOCYTES NFR BLD AUTO: 26.2 %
MAN DIFF?: NORMAL
MCHC RBC-ENTMCNC: 26.4 PG
MCHC RBC-ENTMCNC: 31.6 GM/DL
MCV RBC AUTO: 83.6 FL
MONOCYTES # BLD AUTO: 0.43 K/UL
MONOCYTES NFR BLD AUTO: 4.8 %
NEUTROPHILS # BLD AUTO: 6.01 K/UL
NEUTROPHILS NFR BLD AUTO: 66.8 %
PLATELET # BLD AUTO: 222 K/UL
RBC # BLD: 4.93 M/UL
RBC # FLD: 14.4 %
WBC # FLD AUTO: 9 K/UL

## 2024-02-02 PROCEDURE — G0439: CPT

## 2024-02-02 PROCEDURE — 93000 ELECTROCARDIOGRAM COMPLETE: CPT

## 2024-02-02 PROCEDURE — 99213 OFFICE O/P EST LOW 20 MIN: CPT | Mod: 25

## 2024-02-02 PROCEDURE — 36415 COLL VENOUS BLD VENIPUNCTURE: CPT

## 2024-02-02 RX ORDER — HYDROCHLOROTHIAZIDE 12.5 MG/1
12.5 CAPSULE ORAL
Qty: 90 | Refills: 1 | Status: ACTIVE | COMMUNITY
Start: 1900-01-01 | End: 1900-01-01

## 2024-02-02 RX ORDER — SIMVASTATIN 40 MG/1
40 TABLET, FILM COATED ORAL
Qty: 90 | Refills: 1 | Status: ACTIVE | COMMUNITY
Start: 2020-08-25 | End: 1900-01-01

## 2024-02-02 RX ORDER — FAMOTIDINE 20 MG/1
20 TABLET, FILM COATED ORAL
Qty: 180 | Refills: 0 | Status: ACTIVE | COMMUNITY
Start: 2024-02-02 | End: 1900-01-01

## 2024-02-02 RX ORDER — LOSARTAN POTASSIUM 25 MG/1
25 TABLET, FILM COATED ORAL DAILY
Qty: 90 | Refills: 1 | Status: ACTIVE | COMMUNITY
Start: 1900-01-01 | End: 1900-01-01

## 2024-02-02 NOTE — HISTORY OF PRESENT ILLNESS
[FreeTextEntry1] : Annual [de-identified] : Ms. ROBERTO MONTERO is a 72 year old female presenting for an annual Seen by pulmonologist Dr. Joseph diagnosed with mild NATASHA (obstructive sleep apnea) Hiatal hernia noted on CAT scan.  Patient had an endoscopy previously which showed a hiatal hernia.  Dr. Limon treated her with omeprazole.  She states she discontinued it and is taking Tums. Pulmonologist recommended GI eval/Flonase She saw the cardiologist Dr. Carmichael.  Had TTE December 2023 Pulmonologist suggested repeat CT next year. CPAP as APAP.  She states she is waiting for the equipment to be delivered. Eye surgery Dr Bui did well. Endo Dr Monroy labs and 24 hr urine was normal.  She has follow-up appointment next week. Quit smoking 1972.   Prev Hx States she was seen by Nurse at home will send a report  Was concerned about curvature of spine States I will get a report. has been having tingling in her toes for months. Thinks it is arthritis. Hearing loss was referred to audiology 1/2023 but did not go. States she is concerned and will schedule. States she started taking 5000 IU for low vitamin D Seen by Dr Monroy adrenal nodule seen on MRI. Small cyst and angiomyolipoma renal Had an MRI recently to follow up on this. Colonoscopy  March 2023. Dr Olson put her on Omeprazole. Seen by Eye doctor cataract getting worse Hypertension on Losartan and HCTZ. Hyperlipidemia on simvastatin . Obesity weight has increased since her last visit.  She leads a very sedentary lifestyle and diet is poor Seen by Endo Dr Abdullahi Adrenal nodule has not follow up MRI 6/21 October appointment cancelled because he retired.  Patient was hospitalized with Covid in December.  All symptoms resolved. History of GERD taking PPI was told she had an ulcer on Endoscopy. HTN on Losartan and HCTZ HLD on Simvastatin

## 2024-02-02 NOTE — PHYSICAL EXAM
[Well Nourished] : well nourished [Well Developed] : well developed [Well-Appearing] : well-appearing [Normal Sclera/Conjunctiva] : normal sclera/conjunctiva [No JVD] : no jugular venous distention [No Lymphadenopathy] : no lymphadenopathy [Supple] : supple [Thyroid Normal, No Nodules] : the thyroid was normal and there were no nodules present [No Respiratory Distress] : no respiratory distress  [No Accessory Muscle Use] : no accessory muscle use [Clear to Auscultation] : lungs were clear to auscultation bilaterally [Normal Rate] : normal rate  [Regular Rhythm] : with a regular rhythm [Normal S1, S2] : normal S1 and S2 [No Murmur] : no murmur heard [Pedal Pulses Present] : the pedal pulses are present [No Edema] : there was no peripheral edema [No Extremity Clubbing/Cyanosis] : no extremity clubbing/cyanosis [Soft] : abdomen soft [Non Tender] : non-tender [Non-distended] : non-distended [No Masses] : no abdominal mass palpated [Normal Bowel Sounds] : normal bowel sounds [No CVA Tenderness] : no CVA  tenderness [No Spinal Tenderness] : no spinal tenderness [No Joint Swelling] : no joint swelling [Grossly Normal Strength/Tone] : grossly normal strength/tone [No Rash] : no rash [Coordination Grossly Intact] : coordination grossly intact [No Focal Deficits] : no focal deficits [Normal Gait] : normal gait [Deep Tendon Reflexes (DTR)] : deep tendon reflexes were 2+ and symmetric [Normal Affect] : the affect was normal [Normal Insight/Judgement] : insight and judgment were intact [de-identified] : Some swelling noted on her ankle with ecchymosis on the medial upper aspect near the heel.  No tenderness elicited in phalanx.  Mildly tender on the lateral part of the right foot.

## 2024-02-02 NOTE — ASSESSMENT
[FreeTextEntry1] : Annual SBIRT negative Depression screen negative Check comprehensive labs, in office today  Vaccines  Adacel 2018 Prevnar 20, 1/ 2023 Shingrix 2020/2021 Flu 10/2023 COVID booster declines EKG in chart 1/2023 EKG, NSR low voltage precordial. No acute changes.  Mammogram 10/23 Colonoscopy 3/2023  Hypertension  blood pressure stable on losartan hydrochlorothiazide.  Advised to lose weight low-salt diet and regular exercise routine.  Hyperlipidemia on Simvastatin.  GERD/hiatal hernia: Seeing GI Dr Olson She stopped PPI.  Advised to discontinue Tums Famotidine 20 mg p.o. twice daily as needed Discussed lifestyle changes.  Avoid eating late at night. seen by ENT DR Brown in [place].  She recently got a hearing aid.  Had Endoscopy, she stopped omeprazole, advised to take it as needed. Lifestyle modification.  Osteopenia DEXA 10/22 Advised resistance exercises.  She states that she takes 2 classes in the senior center in her community. She is taking over-the-counter vitamin D supplements and calcium rich diet. Vit D 5000 IU daily  Adrenal Nodule/adenoma MRI 4/23, unchanged compared to prior 24 hr urine normal follow up with Dr Monroy 3/24  Complains of numbness in the arms on and off for years. Seen by Neurologist tested for neuropathy was told it was normal Decided not to pursue MRI.  Obesity lost 12 lbs since her last visit in November. Since she has been diagnosed with sleep apnea she has been trying to make some changes. aware of implications of obesity. Patient motivated to lose weight. Advised to reduce portions. regular exercise routine, states she does an hour 2 x a week of the chair exercises Check weight weekly.  Follow up in 4 months.

## 2024-02-02 NOTE — HEALTH RISK ASSESSMENT
[Good] : ~his/her~  mood as  good [No] : In the past 12 months have you used drugs other than those required for medical reasons? No [No falls in past year] : Patient reported no falls in the past year [Little interest or pleasure doing things] : 1) Little interest or pleasure doing things [Feeling down, depressed, or hopeless] : 2) Feeling down, depressed, or hopeless [0] : 2) Feeling down, depressed, or hopeless: Not at all (0) [PHQ-2 Negative - No further assessment needed] : PHQ-2 Negative - No further assessment needed [Patient reported bone density results were abnormal] : Patient reported bone density results were abnormal [Patient reported colonoscopy was normal] : Patient reported colonoscopy was normal [Behavioral] : behavioral [With Family] : lives with family [] :  [Fully functional (bathing, dressing, toileting, transferring, walking, feeding)] : Fully functional (bathing, dressing, toileting, transferring, walking, feeding) [Fully functional (using the telephone, shopping, preparing meals, housekeeping, doing laundry, using] : Fully functional and needs no help or supervision to perform IADLs (using the telephone, shopping, preparing meals, housekeeping, doing laundry, using transportation, managing medications and managing finances) [Reports changes in hearing] : Reports changes in hearing [Smoke Detector] : smoke detector [Carbon Monoxide Detector] : carbon monoxide detector [Seat Belt] :  uses seat belt [Sunscreen] : uses sunscreen [With Patient/Caregiver] : , with patient/caregiver [Reviewed no changes] : Reviewed, no changes [Designated Healthcare Proxy] : Designated healthcare proxy [Name: ___] : Health Care Proxy's Name: [unfilled]  [Relationship: ___] : Relationship: [unfilled] [Former] : Former [0-4] : 0-4 [> 15 Years] : > 15 Years [Audit-CScore] : 0 [de-identified] : Ankle Fx 4/2021 [AVE1Hedss] : 0 [Change in mental status noted] : No change in mental status noted [Sexually Active] : not sexually active [Reports changes in vision] : Reports no changes in vision [Reports changes in dental health] : Reports no changes in dental health [TB Exposure] : is not being exposed to tuberculosis [Caregiver Concerns] : does not have caregiver concerns [MammogramDate] : 10/2023 [BoneDensityDate] : 10/22 [BoneDensityComments] : osteopenia [ColonoscopyDate] : 3/2023 [de-identified] : Just got hearing aide, b/l SNHL [AdvancecareDate] : 2/2024 [de-identified] : 1971

## 2024-02-05 ENCOUNTER — OFFICE (OUTPATIENT)
Dept: URBAN - METROPOLITAN AREA CLINIC 12 | Facility: CLINIC | Age: 74
Setting detail: OPHTHALMOLOGY
End: 2024-02-05
Payer: MEDICARE

## 2024-02-05 DIAGNOSIS — H11.153: ICD-10-CM

## 2024-02-05 DIAGNOSIS — H43.393: ICD-10-CM

## 2024-02-05 DIAGNOSIS — H16.223: ICD-10-CM

## 2024-02-05 DIAGNOSIS — H35.373: ICD-10-CM

## 2024-02-05 DIAGNOSIS — H52.4: ICD-10-CM

## 2024-02-05 DIAGNOSIS — H02.834: ICD-10-CM

## 2024-02-05 DIAGNOSIS — H35.033: ICD-10-CM

## 2024-02-05 DIAGNOSIS — H35.40: ICD-10-CM

## 2024-02-05 DIAGNOSIS — H02.831: ICD-10-CM

## 2024-02-05 PROCEDURE — 92250 FUNDUS PHOTOGRAPHY W/I&R: CPT | Performed by: OPTOMETRIST

## 2024-02-05 PROCEDURE — 92014 COMPRE OPH EXAM EST PT 1/>: CPT | Performed by: OPTOMETRIST

## 2024-02-05 PROCEDURE — 92015 DETERMINE REFRACTIVE STATE: CPT | Performed by: OPTOMETRIST

## 2024-02-05 ASSESSMENT — REFRACTION_AUTOREFRACTION
OS_SPHERE: +0.50
OD_SPHERE: +0.75
OS_AXIS: 126
OS_CYLINDER: -0.50
OD_AXIS: 051
OD_CYLINDER: -0.75

## 2024-02-05 ASSESSMENT — REFRACTION_CURRENTRX
OD_OVR_VA: 20/
OS_CYLINDER: -0.25
OD_SPHERE: +1.75
OS_VPRISM_DIRECTION: SV
OD_AXIS: 041
OS_OVR_VA: 20/
OD_OVR_VA: 20/
OS_SPHERE: +1.75
OD_SPHERE: +2.50
OD_CYLINDER: -0.50
OS_AXIS: 097
OS_VPRISM_DIRECTION: SV
OS_OVR_VA: 20/
OD_CYLINDER: -0.75
OD_AXIS: 030
OS_AXIS: 010
OS_CYLINDER: -0.50
OS_SPHERE: +2.50
OD_VPRISM_DIRECTION: SV
OD_VPRISM_DIRECTION: SV

## 2024-02-05 ASSESSMENT — REFRACTION_MANIFEST
OS_VA1: 20/40-2
OS_SPHERE: +1.50
OD_AXIS: 055
OD_SPHERE: PLANO
OD_CYLINDER: -0.25
OS_ADD: +2.50
OD_CYLINDER: -0.50
OS_VA1: 20/25-
OD_ADD: +2.50
OD_VA1: 20/40-1
OD_ADD: +2.50
OS_CYLINDER: -0.25
OS_AXIS: 140
OD_SPHERE: +2.00
OS_ADD: +2.50
OD_VA1: 20/25-
OS_CYLINDER: -0.25
OS_AXIS: 125
OD_AXIS: 050
OS_SPHERE: PLANO

## 2024-02-05 ASSESSMENT — SPHEQUIV_DERIVED
OS_SPHEQUIV: 0.25
OS_SPHEQUIV: 1.375
OD_SPHEQUIV: 1.875
OD_SPHEQUIV: 0.375

## 2024-02-05 ASSESSMENT — SUPERFICIAL PUNCTATE KERATITIS (SPK)
OS_SPK: T
OD_SPK: T

## 2024-02-05 ASSESSMENT — LID POSITION - DERMATOCHALASIS: OD_DERMATOCHALASIS: T 1+

## 2024-02-05 ASSESSMENT — CONFRONTATIONAL VISUAL FIELD TEST (CVF)
OS_FINDINGS: FULL
OD_FINDINGS: FULL

## 2024-02-06 ENCOUNTER — TRANSCRIPTION ENCOUNTER (OUTPATIENT)
Age: 74
End: 2024-02-06

## 2024-02-07 LAB
25(OH)D3 SERPL-MCNC: 44.8 NG/ML
ALBUMIN SERPL ELPH-MCNC: 4.7 G/DL
ALP BLD-CCNC: 67 U/L
ALT SERPL-CCNC: 10 U/L
ANION GAP SERPL CALC-SCNC: 16 MMOL/L
AST SERPL-CCNC: 16 U/L
BILIRUB SERPL-MCNC: 0.8 MG/DL
BUN SERPL-MCNC: 14 MG/DL
CALCIUM SERPL-MCNC: 10 MG/DL
CHLORIDE SERPL-SCNC: 102 MMOL/L
CHOLEST SERPL-MCNC: 127 MG/DL
CO2 SERPL-SCNC: 26 MMOL/L
CREAT SERPL-MCNC: 0.9 MG/DL
EGFR: 68 ML/MIN/1.73M2
ESTIMATED AVERAGE GLUCOSE: 114 MG/DL
GLUCOSE SERPL-MCNC: 82 MG/DL
HBA1C MFR BLD HPLC: 5.6 %
HDLC SERPL-MCNC: 48 MG/DL
LDLC SERPL CALC-MCNC: 63 MG/DL
NONHDLC SERPL-MCNC: 79 MG/DL
POTASSIUM SERPL-SCNC: 3.7 MMOL/L
PROT SERPL-MCNC: 7 G/DL
SODIUM SERPL-SCNC: 143 MMOL/L
TRIGL SERPL-MCNC: 87 MG/DL
TSH SERPL-ACNC: 1.31 UIU/ML
URATE SERPL-MCNC: 6.8 MG/DL

## 2024-02-08 ENCOUNTER — APPOINTMENT (OUTPATIENT)
Dept: CARDIOLOGY | Facility: CLINIC | Age: 74
End: 2024-02-08
Payer: MEDICARE

## 2024-02-08 VITALS
BODY MASS INDEX: 40.32 KG/M2 | HEIGHT: 59 IN | SYSTOLIC BLOOD PRESSURE: 128 MMHG | OXYGEN SATURATION: 94 % | DIASTOLIC BLOOD PRESSURE: 80 MMHG | WEIGHT: 200 LBS | HEART RATE: 77 BPM

## 2024-02-08 DIAGNOSIS — K21.9 GASTRO-ESOPHAGEAL REFLUX DISEASE W/OUT ESOPHAGITIS: ICD-10-CM

## 2024-02-08 DIAGNOSIS — R06.00 DYSPNEA, UNSPECIFIED: ICD-10-CM

## 2024-02-08 PROCEDURE — 99214 OFFICE O/P EST MOD 30 MIN: CPT

## 2024-02-08 NOTE — HISTORY OF PRESENT ILLNESS
[FreeTextEntry1] : Ms. ROBERTO MONTERO is a very pleasant 73 year woman with a past medical history of HTN, HLD presenting for evaluation of dyspnea. Sent by Dr. Joseph. He is having her undergo a sleep study. 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 She also has chest pains at rest and with exertion, which omeprazole does not help with. FAmily history of CAD in her father who had MI. She takes her medications as prescribed.   Otherwise, denies orthopnea, paroxysmal nocturnal dyspnea, lower extremity edema, unexplained weight gain or dyspnea on exertion.  BP well controlled today.   2/2024 Presents for follow up. Feels well and has no complaints. Her LOVING has improved significantly. Echo and stress test reviewed: both without evidence of ischemia or valvular heart disease.  Otherwise, denies orthopnea, paroxysmal nocturnal dyspnea, lower extremity edema, unexplained weight gain or dyspnea on exertion.  BP well controlled.

## 2024-02-08 NOTE — DISCUSSION/SUMMARY
[FreeTextEntry1] : Ms. ROBERTO MONTERO is a very pleasant 73 year woman with a past medical history of HTN, HLD, presenting for evlauation of dyspnea.   #Dyspnea on exertion: improved.  - Echocardiogram without evidence of LV dysfunction - Stress echo without evidence of ischemia - ECG with pulmonary pattern-- encouraged pulm f/u  #HTN: well controlled - continue losartan, HCTZ  #HLD: continue simvastatin - LDL 63, acceptable and at goal  return in 12 months

## 2024-03-07 ENCOUNTER — APPOINTMENT (OUTPATIENT)
Dept: ENDOCRINOLOGY | Facility: CLINIC | Age: 74
End: 2024-03-07
Payer: MEDICARE

## 2024-03-07 VITALS
WEIGHT: 198 LBS | HEIGHT: 59 IN | HEART RATE: 75 BPM | SYSTOLIC BLOOD PRESSURE: 116 MMHG | OXYGEN SATURATION: 99 % | DIASTOLIC BLOOD PRESSURE: 72 MMHG | BODY MASS INDEX: 39.92 KG/M2

## 2024-03-07 DIAGNOSIS — E78.5 HYPERLIPIDEMIA, UNSPECIFIED: ICD-10-CM

## 2024-03-07 DIAGNOSIS — M85.80 OTHER SPECIFIED DISORDERS OF BONE DENSITY AND STRUCTURE, UNSPECIFIED SITE: ICD-10-CM

## 2024-03-07 DIAGNOSIS — E27.8 OTHER SPECIFIED DISORDERS OF ADRENAL GLAND: ICD-10-CM

## 2024-03-07 DIAGNOSIS — R73.03 PREDIABETES.: ICD-10-CM

## 2024-03-07 PROCEDURE — 99214 OFFICE O/P EST MOD 30 MIN: CPT

## 2024-03-07 NOTE — ASSESSMENT
[FreeTextEntry1] : Aurora is a 73  yr old female, here for follow up of adrenal adenoma. Has h/o prediabetes, osteopenia.  Seen By Dr. Abdullahi in 2021 for adrenal adenoma. MRI  abdomen in 6/21 showed stable  8 mm right adrenal adenoma. Then MRI abdomen in 4/23 showed stable 1 cm right adrenal nodule. Has h/o HTN, on losartan and HCTZ. In 7/23 and 8/23 we did hormonal eval, 24 hr urine fractionated metanephrines, cortisol, aldosterone, renin, all came back normal. Here for follow up.  Adrenal adenoma: Will  do yearly eval of adrenal nodule. Will do 24 hr urine cortisol, metanephrines and aldosterone in 8/24.   HTN: continue HCTZ and losartan.   Prediabetes: now a1c better in normal range. diet and exercise discussed.  Osteopenia: to  continue vitamin D replacement. bone health discussed.  HLD: continue statin.  RTC in 6 months with labs

## 2024-03-07 NOTE — PHYSICAL EXAM
[Alert] : alert [Well Nourished] : well nourished [No Acute Distress] : no acute distress [Normal Sclera/Conjunctiva] : normal sclera/conjunctiva [PERRL] : pupils equal, round and reactive to light [No Neck Mass] : no neck mass was observed [No Thyroid Nodules] : no palpable thyroid nodules [Thyroid Not Enlarged] : the thyroid was not enlarged [No Respiratory Distress] : no respiratory distress [No Accessory Muscle Use] : no accessory muscle use [Normal S1, S2] : normal S1 and S2 [Clear to Auscultation] : lungs were clear to auscultation bilaterally [Normal Rate] : heart rate was normal [Regular Rhythm] : with a regular rhythm [Oriented x3] : oriented to person, place, and time [Normal Affect] : the affect was normal [Normal Mood] : the mood was normal [Normal Insight/Judgement] : insight and judgment were intact

## 2024-03-07 NOTE — REVIEW OF SYSTEMS
[Fatigue] : no fatigue [Decreased Appetite] : appetite not decreased [Recent Weight Gain (___ Lbs)] : no recent weight gain [Recent Weight Loss (___ Lbs)] : no recent weight loss [Visual Field Defect] : no visual field defect [Dry Eyes] : no dryness [Dysphagia] : no dysphagia [Chest Pain] : no chest pain [Palpitations] : no palpitations [Lower Ext Edema] : no lower extremity edema [Shortness Of Breath] : no shortness of breath [Cough] : no cough [Orthopnea] : no orthopnea [Nausea] : no nausea [Constipation] : no constipation [Abdominal Pain] : no abdominal pain [Vomiting] : no vomiting [Polyuria] : no polyuria [Diarrhea] : no diarrhea [Muscle Weakness] : no muscle weakness [Joint Pain] : no joint pain [Myalgia] : no myalgia  [Acne] : no acne [Acanthosis] : no acanthosis  [Dry Skin] : no dry skin [Headaches] : no headaches [Dizziness] : no dizziness [Tremors] : no tremors [Anxiety] : no anxiety [Depression] : no depression [Polydipsia] : no polydipsia [Cold Intolerance] : no cold intolerance [Heat Intolerance] : no heat intolerance [Easy Bleeding] : no ~M tendency for easy bleeding [Easy Bruising] : no tendency for easy bruising

## 2024-03-07 NOTE — HISTORY OF PRESENT ILLNESS
[FreeTextEntry1] : Aurora is a 73 yr old female, here for follow up of adrenal adenoma. Has h/o prediabetes, osteopenia.  Seen By Dr. Abdullahi in 2021 for adrenal adenoma. MRI  abdomen in 6/21 showed stable  8 mm right adrenal adenoma. Then MRI abdomen in 4/23 showed stable 1 cm right adrenal nodule. Has h/o HTN, on losartan and HCTZ. Says her BP is  controlled on 2 meds. In 7/23 and 8/23 we did hormonal eval, 24 hr urine fractionated metanephrines, cortisol, aldosterone, renin, all came back normal. Here for follow up. Has NATASHA, started Cpap machine. Patient denies any sig. weight changes, but hard to loose weight. No big stretch marks, no easy bruising, no  sugar issues,  Some hair thinning. And some  facial hair growth, not that bad. No LOC, no dizziness. Denies any episodes of headaches, flushing or palpitations. No family h/o adrenal or any endocrine tumors.

## 2024-04-30 ENCOUNTER — APPOINTMENT (OUTPATIENT)
Dept: PULMONOLOGY | Facility: CLINIC | Age: 74
End: 2024-04-30
Payer: MEDICARE

## 2024-04-30 VITALS
WEIGHT: 206 LBS | BODY MASS INDEX: 41.53 KG/M2 | OXYGEN SATURATION: 97 % | DIASTOLIC BLOOD PRESSURE: 70 MMHG | HEART RATE: 79 BPM | SYSTOLIC BLOOD PRESSURE: 126 MMHG | RESPIRATION RATE: 16 BRPM | HEIGHT: 59 IN

## 2024-04-30 DIAGNOSIS — Z87.891 PERSONAL HISTORY OF NICOTINE DEPENDENCE: ICD-10-CM

## 2024-04-30 DIAGNOSIS — R91.8 OTHER NONSPECIFIC ABNORMAL FINDING OF LUNG FIELD: ICD-10-CM

## 2024-04-30 DIAGNOSIS — G47.33 OBSTRUCTIVE SLEEP APNEA (ADULT) (PEDIATRIC): ICD-10-CM

## 2024-04-30 DIAGNOSIS — R05.3 CHRONIC COUGH: ICD-10-CM

## 2024-04-30 DIAGNOSIS — R06.02 SHORTNESS OF BREATH: ICD-10-CM

## 2024-04-30 DIAGNOSIS — E66.01 MORBID (SEVERE) OBESITY DUE TO EXCESS CALORIES: ICD-10-CM

## 2024-04-30 PROCEDURE — G2211 COMPLEX E/M VISIT ADD ON: CPT

## 2024-04-30 PROCEDURE — 99214 OFFICE O/P EST MOD 30 MIN: CPT

## 2024-04-30 NOTE — PLAN
[TextEntry] : Continue CPAP. [place] use of CPAP. Again recc CHUY wayne; gave info on GI at [place], ENT f/u. Flonase for now. F/U with cardio. Can repeat CT 11/2024. Weight loss a must. Further reccs will come.

## 2024-04-30 NOTE — HISTORY OF PRESENT ILLNESS
[Former] : former [< 20 pack-years] : < 20 pack-years [Lab] : lab [TextBox_4] : Presents with a cough for >9 years. Alot of throat mucous. Cough more at night. Worse when laying down; cannot lay flat b/c of cough.  No change.   Also with LOVING the past 6 years. Occ wheeze. SOB worse when flat. OK at rest when upright.  No change.   [place] done1/17/24 - she had difficulty with the maneuvers and could not do it to ATS standards but no abnormalities evident.   CT chest reviewed below..   Hx GERD. Gets acid taste when she eats. CP when she eats as well. Reflux when laying down. Saw GI Dr Olson in [place]. Put on omeprazole; now taking only prn. Latest CXR done 3/2023 with ?HH.  Advised on last visit to see Dr Gonzalez; info given; has not gone yet. Now saying too far away. PCP gave another medicine.   Continuous nasal drip, nasal congestion. On flonase. Saw ENT in the past.   Saw Dr Carmichael for cardio; did stress echo.   Heavy snoring. EDS with ESS 9. PSG with mild NATASHA. Mild PLMS but no significant arousals. Mild NATASHA on PSG done 1/2/24. On CPAP 5-15. Compliance excellent. Therapeutic AHI WNL.    BMI >40.   Stopped smoking at age 22.   Retired .  [APAP:] : APAP [TextBox_100] : 1/2/24 [TextBox_108] : 5.4 [TextBox_125] : 5-15 [TextBox_137] : 100 [TextBox_147] : 0.3 [ESS] : 9

## 2024-04-30 NOTE — PROCEDURE
[FreeTextEntry1] : cxr done 3/7/23: large HH.  tanisha done today 1/17/24 showed no obstruction  -------------- EXAM: [id number] - CT CHEST - ORDERED BY: KENNETH BHATT   PROCEDURE DATE: 11/09/2023    INTERPRETATION: INDICATION: Cough, abnormal chest x-ray  TECHNIQUE: Helical acquisition images of the chest without intravenous contrast. Maximum intensity projection images were generated.  COMPARISON: 11/30/2020 CT chest.  FINDINGS:  LUNGS/AIRWAYS/PLEURA: Patent trachea and bronchi. No bronchiectasis. Minimal groundglass and thin linear opacities in the right lower lobe, likely a combination of dependent atelectasis and scarring. No pleural effusion.  LYMPH NODES/MEDIASTINUM: No lymphadenopathy. Moderate hiatal hernia.  HEART/VASCULATURE: Normal heart size. No pericardial effusion. Coronary artery calcifications. Normal caliber aorta.  UPPER ABDOMEN: Unchanged liver lesion last characterized on MRI abdomen 4/8/2023.  BONES/SOFT TISSUES: Degenerative sclerosis in the lower thoracic spine. No acute fracture.   IMPRESSION:  Moderate hiatal hernia.  --- End of Report ---       KOLE HARRIS M.D., ATTENDING RADIOLOGIST This document has been electronically signed. Nov 17 2023 12:32PM   Notes

## 2024-06-13 ENCOUNTER — APPOINTMENT (OUTPATIENT)
Dept: PULMONOLOGY | Facility: CLINIC | Age: 74
End: 2024-06-13

## 2024-06-24 ENCOUNTER — RX RENEWAL (OUTPATIENT)
Age: 74
End: 2024-06-24

## 2024-08-05 ENCOUNTER — RX RENEWAL (OUTPATIENT)
Age: 74
End: 2024-08-05

## 2024-08-12 ENCOUNTER — LABORATORY RESULT (OUTPATIENT)
Age: 74
End: 2024-08-12

## 2024-09-09 ENCOUNTER — APPOINTMENT (OUTPATIENT)
Dept: ENDOCRINOLOGY | Facility: CLINIC | Age: 74
End: 2024-09-09
Payer: MEDICARE

## 2024-09-09 DIAGNOSIS — M85.80 OTHER SPECIFIED DISORDERS OF BONE DENSITY AND STRUCTURE, UNSPECIFIED SITE: ICD-10-CM

## 2024-09-09 DIAGNOSIS — R73.03 PREDIABETES.: ICD-10-CM

## 2024-09-09 DIAGNOSIS — E78.5 HYPERLIPIDEMIA, UNSPECIFIED: ICD-10-CM

## 2024-09-09 DIAGNOSIS — I10 ESSENTIAL (PRIMARY) HYPERTENSION: ICD-10-CM

## 2024-09-09 DIAGNOSIS — E27.9 DISORDER OF ADRENAL GLAND, UNSPECIFIED: ICD-10-CM

## 2024-09-09 PROCEDURE — 99214 OFFICE O/P EST MOD 30 MIN: CPT

## 2024-09-09 RX ORDER — DEXAMETHASONE 1 MG/1
1 TABLET ORAL
Qty: 1 | Refills: 0 | Status: ACTIVE | COMMUNITY
Start: 2024-09-09 | End: 1900-01-01

## 2024-09-09 NOTE — HISTORY OF PRESENT ILLNESS
[FreeTextEntry1] : Aurora is a 74 yr old female, here for follow up of adrenal adenoma. Has h/o prediabetes, osteopenia.  Seen By Dr. Abdullahi in 2021 for adrenal adenoma. MRI  abdomen in 6/21 showed stable  8 mm right adrenal adenoma. Then MRI abdomen in 4/23 showed stable 1 cm right adrenal nodule. Has h/o HTN, on losartan and HCTZ. Says her BP is  controlled on 2 meds. In 7/23 and 8/23 we did hormonal eval, 24 hr urine fractionated metanephrines, cortisol, aldosterone, renin, all came back normal. Here for follow up.  Had recent repeat hormonal eval  everything came back normal, aldosterone mildly high around 29. Previous aldosterone was normal. Denies any BP spikes, still on 2 meds, well controlled. Has NATASHA, started Cpap machine. Patient denies any sig. weight changes, but hard to loose weight. No big stretch marks, no easy bruising, no  sugar issues,  Some hair thinning.Now better. And some  facial hair growth, not that bad. No LOC, no dizziness. Denies any episodes of headaches, flushing or palpitations. No family h/o adrenal or any endocrine tumors.

## 2024-09-09 NOTE — REVIEW OF SYSTEMS
[Fatigue] : no fatigue [Decreased Appetite] : appetite not decreased [Recent Weight Gain (___ Lbs)] : no recent weight gain [Recent Weight Loss (___ Lbs)] : no recent weight loss [Visual Field Defect] : no visual field defect [Dry Eyes] : no dryness [Dysphagia] : no dysphagia [Chest Pain] : no chest pain [Palpitations] : no palpitations [Lower Ext Edema] : no lower extremity edema [Shortness Of Breath] : no shortness of breath [Cough] : no cough [Orthopnea] : no orthopnea [Nausea] : no nausea [Constipation] : no constipation [Abdominal Pain] : no abdominal pain [Vomiting] : no vomiting [Diarrhea] : no diarrhea [Polyuria] : no polyuria [Joint Pain] : no joint pain [Muscle Weakness] : no muscle weakness [Myalgia] : no myalgia  [Acanthosis] : no acanthosis  [Acne] : no acne [Dry Skin] : no dry skin [Headaches] : no headaches [Dizziness] : no dizziness [Tremors] : no tremors [Depression] : no depression [Anxiety] : no anxiety [Polydipsia] : no polydipsia [Cold Intolerance] : no cold intolerance [Heat Intolerance] : no heat intolerance [Easy Bleeding] : no ~M tendency for easy bleeding [Easy Bruising] : no tendency for easy bruising

## 2024-09-09 NOTE — PHYSICAL EXAM
[Alert] : alert [Well Nourished] : well nourished [No Acute Distress] : no acute distress [Normal Sclera/Conjunctiva] : normal sclera/conjunctiva [No Neck Mass] : no neck mass was observed [Thyroid Not Enlarged] : the thyroid was not enlarged [No Thyroid Nodules] : no palpable thyroid nodules [No Respiratory Distress] : no respiratory distress [No Accessory Muscle Use] : no accessory muscle use [Clear to Auscultation] : lungs were clear to auscultation bilaterally [Normal S1, S2] : normal S1 and S2 [Normal Rate] : heart rate was normal [Regular Rhythm] : with a regular rhythm [No Tremors] : no tremors [Oriented x3] : oriented to person, place, and time [Normal Affect] : the affect was normal [Normal Insight/Judgement] : insight and judgment were intact [Normal Mood] : the mood was normal

## 2024-09-09 NOTE — ASSESSMENT
[FreeTextEntry1] : Aurora is a 73  yr old female, here for follow up of adrenal adenoma. Has h/o prediabetes, osteopenia.  Seen By Dr. Abdullahi in 2021 for adrenal adenoma. MRI  abdomen in 6/21 showed stable  8 mm right adrenal adenoma. Then MRI abdomen in 4/23 showed stable 1 cm right adrenal nodule. Has h/o HTN, on losartan and HCTZ. In 7/23 and 8/23 we did hormonal eval, 24 hr urine fractionated metanephrines, cortisol, aldosterone, renin, all came back normal. Here for follow up. Had recent repeat hormonal eval  everything came back normal, aldosterone mildly high around 29. Previous aldosterone was normal. Denies any BP spikes, still on 2 meds, well controlled  Adrenal adenoma: Will repeat aldosterone and renin again as aldosterone was high this time.  will also do DST.   HTN: continue HCTZ and losartan.   Prediabetes: now a1c better in normal range. diet and exercise discussed.  Osteopenia: to  continue vitamin D replacement. bone health discussed.  HLD: continue statin.  RTC in 1 yr with labs      General Sunscreen Counseling: I recommended a broad spectrum sunscreen with a SPF of 30 or higher.  I explained that SPF 30 sunscreens block approximately 97 percent of the sun's harmful rays.  Sunscreens should be applied at least 15 minutes prior to expected sun exposure and then every 2 hours after that as long as sun exposure continues. If swimming or exercising sunscreen should be reapplied every 45 minutes to an hour after getting wet or sweating. I also recommended a lip balm with a sunscreen as well. Sun protective clothing can be used in lieu of sunscreen but must be worn the entire time you are exposed to the sun's rays. Detail Level: Detailed

## 2024-09-14 ENCOUNTER — LABORATORY RESULT (OUTPATIENT)
Age: 74
End: 2024-09-14

## 2024-09-26 RX ORDER — DEXAMETHASONE 1 MG/1
1 TABLET ORAL
Qty: 1 | Refills: 0 | Status: ACTIVE | COMMUNITY
Start: 2024-09-26 | End: 1900-01-01

## 2024-10-03 ENCOUNTER — LABORATORY RESULT (OUTPATIENT)
Age: 74
End: 2024-10-03

## 2024-10-09 ENCOUNTER — APPOINTMENT (OUTPATIENT)
Dept: FAMILY MEDICINE | Facility: CLINIC | Age: 74
End: 2024-10-09
Payer: MEDICARE

## 2024-10-09 VITALS
RESPIRATION RATE: 15 BRPM | HEART RATE: 69 BPM | OXYGEN SATURATION: 98 % | TEMPERATURE: 97.4 F | BODY MASS INDEX: 43.14 KG/M2 | SYSTOLIC BLOOD PRESSURE: 130 MMHG | DIASTOLIC BLOOD PRESSURE: 70 MMHG | WEIGHT: 214 LBS | HEIGHT: 59 IN

## 2024-10-09 DIAGNOSIS — E27.9 DISORDER OF ADRENAL GLAND, UNSPECIFIED: ICD-10-CM

## 2024-10-09 DIAGNOSIS — R91.8 OTHER NONSPECIFIC ABNORMAL FINDING OF LUNG FIELD: ICD-10-CM

## 2024-10-09 DIAGNOSIS — E66.9 OBESITY, UNSPECIFIED: ICD-10-CM

## 2024-10-09 DIAGNOSIS — G47.33 OBSTRUCTIVE SLEEP APNEA (ADULT) (PEDIATRIC): ICD-10-CM

## 2024-10-09 DIAGNOSIS — I10 ESSENTIAL (PRIMARY) HYPERTENSION: ICD-10-CM

## 2024-10-09 DIAGNOSIS — R73.03 PREDIABETES.: ICD-10-CM

## 2024-10-09 DIAGNOSIS — Z23 ENCOUNTER FOR IMMUNIZATION: ICD-10-CM

## 2024-10-09 DIAGNOSIS — E78.5 HYPERLIPIDEMIA, UNSPECIFIED: ICD-10-CM

## 2024-10-09 DIAGNOSIS — E66.01 MORBID (SEVERE) OBESITY DUE TO EXCESS CALORIES: ICD-10-CM

## 2024-10-09 PROCEDURE — G0008: CPT

## 2024-10-09 PROCEDURE — 90662 IIV NO PRSV INCREASED AG IM: CPT

## 2024-10-09 PROCEDURE — G0447 BEHAVIOR COUNSEL OBESITY 15M: CPT | Mod: 59

## 2024-10-09 PROCEDURE — 99215 OFFICE O/P EST HI 40 MIN: CPT

## 2024-10-13 LAB
ALBUMIN SERPL ELPH-MCNC: 4.3 G/DL
ALP BLD-CCNC: 74 U/L
ALT SERPL-CCNC: 12 U/L
ANION GAP SERPL CALC-SCNC: 13 MMOL/L
AST SERPL-CCNC: 16 U/L
BASOPHILS # BLD AUTO: 0.05 K/UL
BASOPHILS NFR BLD AUTO: 0.6 %
BILIRUB SERPL-MCNC: 0.7 MG/DL
BUN SERPL-MCNC: 13 MG/DL
CALCIUM SERPL-MCNC: 9.6 MG/DL
CHLORIDE SERPL-SCNC: 104 MMOL/L
CHOLEST SERPL-MCNC: 144 MG/DL
CO2 SERPL-SCNC: 27 MMOL/L
CREAT SERPL-MCNC: 0.79 MG/DL
EGFR: 79 ML/MIN/1.73M2
EOSINOPHIL # BLD AUTO: 0.16 K/UL
EOSINOPHIL NFR BLD AUTO: 1.8 %
ESTIMATED AVERAGE GLUCOSE: 120 MG/DL
GLUCOSE SERPL-MCNC: 92 MG/DL
HBA1C MFR BLD HPLC: 5.8 %
HCT VFR BLD CALC: 40.2 %
HDLC SERPL-MCNC: 55 MG/DL
HGB BLD-MCNC: 12.8 G/DL
IMM GRANULOCYTES NFR BLD AUTO: 0.2 %
LDLC SERPL CALC-MCNC: 71 MG/DL
LYMPHOCYTES # BLD AUTO: 2.59 K/UL
LYMPHOCYTES NFR BLD AUTO: 28.9 %
MAN DIFF?: NORMAL
MCHC RBC-ENTMCNC: 27.2 PG
MCHC RBC-ENTMCNC: 31.8 GM/DL
MCV RBC AUTO: 85.5 FL
MONOCYTES # BLD AUTO: 0.45 K/UL
MONOCYTES NFR BLD AUTO: 5 %
NEUTROPHILS # BLD AUTO: 5.68 K/UL
NEUTROPHILS NFR BLD AUTO: 63.5 %
NONHDLC SERPL-MCNC: 89 MG/DL
PLATELET # BLD AUTO: 221 K/UL
POTASSIUM SERPL-SCNC: 4.1 MMOL/L
PROT SERPL-MCNC: 6.9 G/DL
RBC # BLD: 4.7 M/UL
RBC # FLD: 14.4 %
SODIUM SERPL-SCNC: 144 MMOL/L
TRIGL SERPL-MCNC: 99 MG/DL
TSH SERPL-ACNC: 1.47 UIU/ML
WBC # FLD AUTO: 8.95 K/UL

## 2024-10-28 ENCOUNTER — APPOINTMENT (OUTPATIENT)
Dept: MAMMOGRAPHY | Facility: CLINIC | Age: 74
End: 2024-10-28
Payer: MEDICARE

## 2024-10-28 ENCOUNTER — OUTPATIENT (OUTPATIENT)
Dept: OUTPATIENT SERVICES | Facility: HOSPITAL | Age: 74
LOS: 1 days | End: 2024-10-28
Payer: COMMERCIAL

## 2024-10-28 ENCOUNTER — RESULT REVIEW (OUTPATIENT)
Age: 74
End: 2024-10-28

## 2024-10-28 DIAGNOSIS — Z12.39 ENCOUNTER FOR OTHER SCREENING FOR MALIGNANT NEOPLASM OF BREAST: ICD-10-CM

## 2024-10-28 DIAGNOSIS — Z98.890 OTHER SPECIFIED POSTPROCEDURAL STATES: Chronic | ICD-10-CM

## 2024-10-28 PROCEDURE — 77063 BREAST TOMOSYNTHESIS BI: CPT

## 2024-10-28 PROCEDURE — 77063 BREAST TOMOSYNTHESIS BI: CPT | Mod: 26

## 2024-10-28 PROCEDURE — 77067 SCR MAMMO BI INCL CAD: CPT | Mod: 26

## 2024-10-28 PROCEDURE — 77067 SCR MAMMO BI INCL CAD: CPT

## 2024-11-02 ENCOUNTER — OUTPATIENT (OUTPATIENT)
Dept: OUTPATIENT SERVICES | Facility: HOSPITAL | Age: 74
LOS: 1 days | End: 2024-11-02
Payer: COMMERCIAL

## 2024-11-02 ENCOUNTER — APPOINTMENT (OUTPATIENT)
Dept: CT IMAGING | Facility: CLINIC | Age: 74
End: 2024-11-02

## 2024-11-02 DIAGNOSIS — Z98.890 OTHER SPECIFIED POSTPROCEDURAL STATES: Chronic | ICD-10-CM

## 2024-11-02 DIAGNOSIS — R91.8 OTHER NONSPECIFIC ABNORMAL FINDING OF LUNG FIELD: ICD-10-CM

## 2024-11-02 PROCEDURE — 71250 CT THORAX DX C-: CPT | Mod: 26

## 2024-11-02 PROCEDURE — 71250 CT THORAX DX C-: CPT

## 2024-11-21 ENCOUNTER — APPOINTMENT (OUTPATIENT)
Dept: PULMONOLOGY | Facility: CLINIC | Age: 74
End: 2024-11-21
Payer: MEDICARE

## 2024-11-21 VITALS
OXYGEN SATURATION: 97 % | HEIGHT: 59 IN | SYSTOLIC BLOOD PRESSURE: 130 MMHG | DIASTOLIC BLOOD PRESSURE: 60 MMHG | HEART RATE: 78 BPM | BODY MASS INDEX: 43.14 KG/M2 | WEIGHT: 214 LBS | RESPIRATION RATE: 16 BRPM

## 2024-11-21 DIAGNOSIS — G47.10 HYPERSOMNIA, UNSPECIFIED: ICD-10-CM

## 2024-11-21 DIAGNOSIS — R05.3 CHRONIC COUGH: ICD-10-CM

## 2024-11-21 DIAGNOSIS — G47.33 OBSTRUCTIVE SLEEP APNEA (ADULT) (PEDIATRIC): ICD-10-CM

## 2024-11-21 DIAGNOSIS — G47.30 HYPERSOMNIA, UNSPECIFIED: ICD-10-CM

## 2024-11-21 DIAGNOSIS — K21.9 GASTRO-ESOPHAGEAL REFLUX DISEASE W/OUT ESOPHAGITIS: ICD-10-CM

## 2024-11-21 DIAGNOSIS — E66.01 MORBID (SEVERE) OBESITY DUE TO EXCESS CALORIES: ICD-10-CM

## 2024-11-21 PROCEDURE — G2211 COMPLEX E/M VISIT ADD ON: CPT

## 2024-11-21 PROCEDURE — 99214 OFFICE O/P EST MOD 30 MIN: CPT

## 2025-02-24 ENCOUNTER — OFFICE (OUTPATIENT)
Dept: URBAN - METROPOLITAN AREA CLINIC 12 | Facility: CLINIC | Age: 75
Setting detail: OPHTHALMOLOGY
End: 2025-02-24
Payer: COMMERCIAL

## 2025-02-24 DIAGNOSIS — H16.223: ICD-10-CM

## 2025-02-24 DIAGNOSIS — H43.393: ICD-10-CM

## 2025-02-24 DIAGNOSIS — H35.373: ICD-10-CM

## 2025-02-24 PROCEDURE — 92134 CPTRZ OPH DX IMG PST SGM RTA: CPT | Performed by: OPTOMETRIST

## 2025-02-24 PROCEDURE — 92014 COMPRE OPH EXAM EST PT 1/>: CPT | Performed by: OPTOMETRIST

## 2025-02-24 ASSESSMENT — KERATOMETRY
OD_K2POWER_DIOPTERS: 46.25
OD_K1POWER_DIOPTERS: 45.75
OS_AXISANGLE_DEGREES: 065
OS_K1POWER_DIOPTERS: 46.00
OS_K2POWER_DIOPTERS: 46.75
METHOD_AUTO_MANUAL: AUTO
OD_AXISANGLE_DEGREES: 105

## 2025-02-24 ASSESSMENT — REFRACTION_CURRENTRX
OS_VPRISM_DIRECTION: SV
OS_OVR_VA: 20/
OS_VPRISM_DIRECTION: SV
OS_CYLINDER: -0.25
OD_OVR_VA: 20/
OD_VPRISM_DIRECTION: SV
OS_SPHERE: +2.50
OD_OVR_VA: 20/
OD_VPRISM_DIRECTION: SV
OD_SPHERE: +2.50
OS_CYLINDER: SPH
OS_OVR_VA: 20/
OS_SPHERE: +1.75
OD_SPHERE: +1.75
OD_CYLINDER: -0.25
OD_CYLINDER: -0.50
OS_AXIS: 010
OD_AXIS: 041
OD_AXIS: 050

## 2025-02-24 ASSESSMENT — REFRACTION_MANIFEST
OD_CYLINDER: -0.25
OS_VA1: 20/40-2
OD_SPHERE: +2.00
OD_ADD: +2.50
OD_VA1: 20/40-1
OS_VA1: 20/25-
OD_ADD: +2.50
OD_AXIS: 050
OS_SPHERE: +1.50
OS_AXIS: 125
OS_CYLINDER: -0.25
OS_SPHERE: PLANO
OS_ADD: +2.50
OD_CYLINDER: -0.50
OD_VA1: 20/25-
OS_CYLINDER: -0.25
OS_ADD: +2.50
OD_SPHERE: PLANO
OS_AXIS: 140
OD_AXIS: 055

## 2025-02-24 ASSESSMENT — LID POSITION - DERMATOCHALASIS: OD_DERMATOCHALASIS: T 1+

## 2025-02-24 ASSESSMENT — REFRACTION_AUTOREFRACTION
OD_AXIS: 061
OD_CYLINDER: -0.50
OS_CYLINDER: -0.75
OS_SPHERE: +0.75
OD_SPHERE: +0.50
OS_AXIS: 137

## 2025-02-24 ASSESSMENT — CONFRONTATIONAL VISUAL FIELD TEST (CVF)
OD_FINDINGS: FULL
OS_FINDINGS: FULL

## 2025-02-24 ASSESSMENT — TONOMETRY
OS_IOP_MMHG: 15
OD_IOP_MMHG: 15

## 2025-02-24 ASSESSMENT — SUPERFICIAL PUNCTATE KERATITIS (SPK)
OS_SPK: T
OD_SPK: T

## 2025-02-24 ASSESSMENT — VISUAL ACUITY
OD_BCVA: 20/30-
OS_BCVA: 20/40

## 2025-03-27 ENCOUNTER — APPOINTMENT (OUTPATIENT)
Dept: RADIOLOGY | Facility: CLINIC | Age: 75
End: 2025-03-27

## 2025-03-27 ENCOUNTER — RESULT REVIEW (OUTPATIENT)
Age: 75
End: 2025-03-27

## 2025-03-27 ENCOUNTER — APPOINTMENT (OUTPATIENT)
Dept: INTERNAL MEDICINE | Facility: CLINIC | Age: 75
End: 2025-03-27
Payer: MEDICARE

## 2025-03-27 ENCOUNTER — OUTPATIENT (OUTPATIENT)
Dept: OUTPATIENT SERVICES | Facility: HOSPITAL | Age: 75
LOS: 1 days | End: 2025-03-27
Payer: COMMERCIAL

## 2025-03-27 VITALS
WEIGHT: 208 LBS | DIASTOLIC BLOOD PRESSURE: 80 MMHG | SYSTOLIC BLOOD PRESSURE: 134 MMHG | HEART RATE: 75 BPM | RESPIRATION RATE: 15 BRPM | OXYGEN SATURATION: 95 % | BODY MASS INDEX: 41.93 KG/M2 | TEMPERATURE: 98 F | HEIGHT: 59 IN

## 2025-03-27 DIAGNOSIS — M54.12 RADICULOPATHY, CERVICAL REGION: ICD-10-CM

## 2025-03-27 PROCEDURE — 72040 X-RAY EXAM NECK SPINE 2-3 VW: CPT | Mod: 26

## 2025-03-27 PROCEDURE — 99213 OFFICE O/P EST LOW 20 MIN: CPT

## 2025-03-27 PROCEDURE — 72040 X-RAY EXAM NECK SPINE 2-3 VW: CPT

## 2025-03-27 RX ORDER — TIZANIDINE 4 MG/1
4 TABLET ORAL
Qty: 7 | Refills: 0 | Status: ACTIVE | COMMUNITY
Start: 2025-03-27 | End: 1900-01-01

## 2025-04-03 RX ORDER — METHYLPREDNISOLONE 4 MG/1
4 TABLET ORAL
Qty: 1 | Refills: 0 | Status: ACTIVE | COMMUNITY
Start: 2025-04-03 | End: 1900-01-01

## 2025-04-25 ENCOUNTER — LABORATORY RESULT (OUTPATIENT)
Age: 75
End: 2025-04-25

## 2025-04-25 ENCOUNTER — APPOINTMENT (OUTPATIENT)
Dept: FAMILY MEDICINE | Facility: CLINIC | Age: 75
End: 2025-04-25

## 2025-04-25 ENCOUNTER — NON-APPOINTMENT (OUTPATIENT)
Age: 75
End: 2025-04-25

## 2025-04-25 VITALS
SYSTOLIC BLOOD PRESSURE: 126 MMHG | RESPIRATION RATE: 15 BRPM | BODY MASS INDEX: 41.12 KG/M2 | HEART RATE: 69 BPM | WEIGHT: 204 LBS | TEMPERATURE: 97.8 F | OXYGEN SATURATION: 97 % | DIASTOLIC BLOOD PRESSURE: 79 MMHG | HEIGHT: 59 IN

## 2025-04-25 DIAGNOSIS — I10 ESSENTIAL (PRIMARY) HYPERTENSION: ICD-10-CM

## 2025-04-25 DIAGNOSIS — Z86.69 PERSONAL HISTORY OF OTHER DISEASES OF THE NERVOUS SYSTEM AND SENSE ORGANS: ICD-10-CM

## 2025-04-25 DIAGNOSIS — G47.30 HYPERSOMNIA, UNSPECIFIED: ICD-10-CM

## 2025-04-25 DIAGNOSIS — R05.3 CHRONIC COUGH: ICD-10-CM

## 2025-04-25 DIAGNOSIS — G47.33 OBSTRUCTIVE SLEEP APNEA (ADULT) (PEDIATRIC): ICD-10-CM

## 2025-04-25 DIAGNOSIS — E66.01 MORBID (SEVERE) OBESITY DUE TO EXCESS CALORIES: ICD-10-CM

## 2025-04-25 DIAGNOSIS — M54.12 RADICULOPATHY, CERVICAL REGION: ICD-10-CM

## 2025-04-25 DIAGNOSIS — E78.5 HYPERLIPIDEMIA, UNSPECIFIED: ICD-10-CM

## 2025-04-25 DIAGNOSIS — E27.9 DISORDER OF ADRENAL GLAND, UNSPECIFIED: ICD-10-CM

## 2025-04-25 DIAGNOSIS — R73.03 PREDIABETES.: ICD-10-CM

## 2025-04-25 DIAGNOSIS — G47.10 HYPERSOMNIA, UNSPECIFIED: ICD-10-CM

## 2025-04-25 DIAGNOSIS — Z00.00 ENCOUNTER FOR GENERAL ADULT MEDICAL EXAMINATION W/OUT ABNORMAL FINDINGS: ICD-10-CM

## 2025-04-25 DIAGNOSIS — M85.80 OTHER SPECIFIED DISORDERS OF BONE DENSITY AND STRUCTURE, UNSPECIFIED SITE: ICD-10-CM

## 2025-04-25 DIAGNOSIS — M54.6 PAIN IN THORACIC SPINE: ICD-10-CM

## 2025-04-25 DIAGNOSIS — K21.9 GASTRO-ESOPHAGEAL REFLUX DISEASE W/OUT ESOPHAGITIS: ICD-10-CM

## 2025-04-25 PROCEDURE — G0439: CPT

## 2025-04-25 PROCEDURE — 36415 COLL VENOUS BLD VENIPUNCTURE: CPT

## 2025-04-25 PROCEDURE — 93000 ELECTROCARDIOGRAM COMPLETE: CPT

## 2025-04-25 PROCEDURE — 99214 OFFICE O/P EST MOD 30 MIN: CPT | Mod: 25

## 2025-04-25 RX ORDER — CHROMIUM 200 MCG
TABLET ORAL
Refills: 0 | Status: ACTIVE | COMMUNITY

## 2025-04-25 RX ORDER — LIDOCAINE 5% 700 MG/1
5 PATCH TOPICAL
Qty: 1 | Refills: 1 | Status: ACTIVE | COMMUNITY
Start: 2025-04-25 | End: 1900-01-01

## 2025-04-27 PROBLEM — E66.01 OBESITY, CLASS III, BMI 40-49.9 (MORBID OBESITY): Status: ACTIVE | Noted: 2025-04-27

## 2025-04-27 LAB
25(OH)D3 SERPL-MCNC: 36.5 NG/ML
ALBUMIN SERPL ELPH-MCNC: 4.3 G/DL
ALP BLD-CCNC: 65 U/L
ALT SERPL-CCNC: 19 U/L
ANION GAP SERPL CALC-SCNC: 16 MMOL/L
APPEARANCE: CLEAR
AST SERPL-CCNC: 21 U/L
BASOPHILS # BLD AUTO: 0.07 K/UL
BASOPHILS NFR BLD AUTO: 0.9 %
BILIRUB SERPL-MCNC: 0.6 MG/DL
BILIRUBIN URINE: NEGATIVE
BLOOD URINE: NEGATIVE
BUN SERPL-MCNC: 15 MG/DL
CALCIUM SERPL-MCNC: 9.6 MG/DL
CHLORIDE SERPL-SCNC: 104 MMOL/L
CHOLEST SERPL-MCNC: 130 MG/DL
CO2 SERPL-SCNC: 24 MMOL/L
COLOR: YELLOW
CREAT SERPL-MCNC: 0.78 MG/DL
EGFRCR SERPLBLD CKD-EPI 2021: 80 ML/MIN/1.73M2
EOSINOPHIL # BLD AUTO: 0.24 K/UL
EOSINOPHIL NFR BLD AUTO: 3.1 %
ESTIMATED AVERAGE GLUCOSE: 123 MG/DL
GLUCOSE QUALITATIVE U: NEGATIVE MG/DL
GLUCOSE SERPL-MCNC: 99 MG/DL
HBA1C MFR BLD HPLC: 5.9 %
HCT VFR BLD CALC: 40.1 %
HDLC SERPL-MCNC: 46 MG/DL
HGB BLD-MCNC: 12.4 G/DL
IMM GRANULOCYTES NFR BLD AUTO: 0.3 %
KETONES URINE: NEGATIVE MG/DL
LDLC SERPL-MCNC: 65 MG/DL
LEUKOCYTE ESTERASE URINE: ABNORMAL
LYMPHOCYTES # BLD AUTO: 2.49 K/UL
LYMPHOCYTES NFR BLD AUTO: 32.3 %
MAN DIFF?: NORMAL
MCHC RBC-ENTMCNC: 26.8 PG
MCHC RBC-ENTMCNC: 30.9 G/DL
MCV RBC AUTO: 86.6 FL
MONOCYTES # BLD AUTO: 0.41 K/UL
MONOCYTES NFR BLD AUTO: 5.3 %
NEUTROPHILS # BLD AUTO: 4.49 K/UL
NEUTROPHILS NFR BLD AUTO: 58.1 %
NITRITE URINE: NEGATIVE
NONHDLC SERPL-MCNC: 84 MG/DL
PH URINE: 5.5
PLATELET # BLD AUTO: 205 K/UL
POTASSIUM SERPL-SCNC: 3.8 MMOL/L
PROT SERPL-MCNC: 6.9 G/DL
PROTEIN URINE: NORMAL MG/DL
RBC # BLD: 4.63 M/UL
RBC # FLD: 15.6 %
SODIUM SERPL-SCNC: 145 MMOL/L
SPECIFIC GRAVITY URINE: >1.03
TRIGL SERPL-MCNC: 101 MG/DL
TSH SERPL-ACNC: 1.19 UIU/ML
URATE SERPL-MCNC: 5.1 MG/DL
UROBILINOGEN URINE: 1 MG/DL
WBC # FLD AUTO: 7.72 K/UL

## 2025-05-02 ENCOUNTER — OUTPATIENT (OUTPATIENT)
Dept: OUTPATIENT SERVICES | Facility: HOSPITAL | Age: 75
LOS: 1 days | End: 2025-05-02
Payer: COMMERCIAL

## 2025-05-02 ENCOUNTER — APPOINTMENT (OUTPATIENT)
Dept: RADIOLOGY | Facility: CLINIC | Age: 75
End: 2025-05-02
Payer: MEDICARE

## 2025-05-02 DIAGNOSIS — Z98.890 OTHER SPECIFIED POSTPROCEDURAL STATES: Chronic | ICD-10-CM

## 2025-05-02 DIAGNOSIS — M85.80 OTHER SPECIFIED DISORDERS OF BONE DENSITY AND STRUCTURE, UNSPECIFIED SITE: ICD-10-CM

## 2025-05-02 LAB — HEMOCCULT STL QL IA: POSITIVE

## 2025-05-02 PROCEDURE — 77085 DXA BONE DENSITY AXL VRT FX: CPT

## 2025-05-02 PROCEDURE — 77085 DXA BONE DENSITY AXL VRT FX: CPT | Mod: 26

## 2025-05-08 ENCOUNTER — TRANSCRIPTION ENCOUNTER (OUTPATIENT)
Age: 75
End: 2025-05-08

## 2025-05-22 ENCOUNTER — APPOINTMENT (OUTPATIENT)
Dept: PULMONOLOGY | Facility: CLINIC | Age: 75
End: 2025-05-22
Payer: MEDICARE

## 2025-05-22 VITALS
HEIGHT: 59 IN | OXYGEN SATURATION: 97 % | SYSTOLIC BLOOD PRESSURE: 133 MMHG | BODY MASS INDEX: 40.72 KG/M2 | DIASTOLIC BLOOD PRESSURE: 75 MMHG | RESPIRATION RATE: 16 BRPM | HEART RATE: 70 BPM | WEIGHT: 202 LBS

## 2025-05-22 DIAGNOSIS — E66.01 MORBID (SEVERE) OBESITY DUE TO EXCESS CALORIES: ICD-10-CM

## 2025-05-22 DIAGNOSIS — G47.33 OBSTRUCTIVE SLEEP APNEA (ADULT) (PEDIATRIC): ICD-10-CM

## 2025-05-22 DIAGNOSIS — G47.10 HYPERSOMNIA, UNSPECIFIED: ICD-10-CM

## 2025-05-22 DIAGNOSIS — R06.02 SHORTNESS OF BREATH: ICD-10-CM

## 2025-05-22 DIAGNOSIS — G47.30 HYPERSOMNIA, UNSPECIFIED: ICD-10-CM

## 2025-05-22 PROCEDURE — G2211 COMPLEX E/M VISIT ADD ON: CPT

## 2025-05-22 PROCEDURE — 99214 OFFICE O/P EST MOD 30 MIN: CPT

## 2025-05-28 ENCOUNTER — APPOINTMENT (OUTPATIENT)
Dept: GASTROENTEROLOGY | Facility: CLINIC | Age: 75
End: 2025-05-28
Payer: MEDICARE

## 2025-05-28 VITALS
BODY MASS INDEX: 41.53 KG/M2 | DIASTOLIC BLOOD PRESSURE: 78 MMHG | WEIGHT: 206 LBS | HEIGHT: 59 IN | SYSTOLIC BLOOD PRESSURE: 116 MMHG

## 2025-05-28 DIAGNOSIS — K92.1 MELENA: ICD-10-CM

## 2025-05-28 DIAGNOSIS — R19.5 OTHER FECAL ABNORMALITIES: ICD-10-CM

## 2025-05-28 PROCEDURE — 99214 OFFICE O/P EST MOD 30 MIN: CPT

## 2025-05-28 RX ORDER — OMEPRAZOLE 20 MG/1
20 CAPSULE, DELAYED RELEASE ORAL
Qty: 90 | Refills: 3 | Status: ACTIVE | COMMUNITY
Start: 2025-05-28 | End: 1900-01-01

## 2025-07-11 ENCOUNTER — APPOINTMENT (OUTPATIENT)
Dept: GASTROENTEROLOGY | Facility: HOSPITAL | Age: 75
End: 2025-07-11

## 2025-07-11 ENCOUNTER — OUTPATIENT (OUTPATIENT)
Dept: OUTPATIENT SERVICES | Facility: HOSPITAL | Age: 75
LOS: 1 days | Discharge: HOME | End: 2025-07-11
Payer: MEDICARE

## 2025-07-11 ENCOUNTER — RESULT REVIEW (OUTPATIENT)
Age: 75
End: 2025-07-11

## 2025-07-11 VITALS
TEMPERATURE: 97 F | HEIGHT: 61 IN | RESPIRATION RATE: 16 BRPM | OXYGEN SATURATION: 98 % | SYSTOLIC BLOOD PRESSURE: 137 MMHG | HEART RATE: 71 BPM | DIASTOLIC BLOOD PRESSURE: 75 MMHG | WEIGHT: 207.01 LBS

## 2025-07-11 DIAGNOSIS — Z98.890 OTHER SPECIFIED POSTPROCEDURAL STATES: Chronic | ICD-10-CM

## 2025-07-11 DIAGNOSIS — R19.5 OTHER FECAL ABNORMALITIES: ICD-10-CM

## 2025-07-11 PROCEDURE — 88312 SPECIAL STAINS GROUP 1: CPT

## 2025-07-11 PROCEDURE — 45385 COLONOSCOPY W/LESION REMOVAL: CPT

## 2025-07-11 PROCEDURE — 88305 TISSUE EXAM BY PATHOLOGIST: CPT

## 2025-07-11 PROCEDURE — 43239 EGD BIOPSY SINGLE/MULTIPLE: CPT

## 2025-07-11 PROCEDURE — 88312 SPECIAL STAINS GROUP 1: CPT | Mod: 26

## 2025-07-11 PROCEDURE — 88305 TISSUE EXAM BY PATHOLOGIST: CPT | Mod: 26

## 2025-07-11 NOTE — ASU PREOP CHECKLIST - NS PREOP CHK MONITOR ANESTHESIA CONSENT
done Render Note In Bullet Format When Appropriate: No Show Applicator Variable?: Yes Application Tool (Optional): Liquid Nitrogen Sprayer Duration Of Freeze Thaw-Cycle (Seconds): 5 Detail Level: Detailed Post-Care Instructions: I reviewed with the patient in detail post-care instructions. Patient is to wear sunprotection, and avoid picking at any of the treated lesions. Pt may apply Vaseline to crusted or scabbing areas. Number Of Freeze-Thaw Cycles: 3 freeze-thaw cycles Consent: The patient's consent was obtained including but not limited to risks of crusting, scabbing, blistering, scarring, darker or lighter pigmentary change, recurrence, incomplete removal and infection.

## 2025-07-15 LAB — SURGICAL PATHOLOGY STUDY: SIGNIFICANT CHANGE UP

## 2025-07-16 DIAGNOSIS — K31.89 OTHER DISEASES OF STOMACH AND DUODENUM: ICD-10-CM

## 2025-07-16 DIAGNOSIS — D12.8 BENIGN NEOPLASM OF RECTUM: ICD-10-CM

## 2025-07-16 DIAGNOSIS — K29.50 UNSPECIFIED CHRONIC GASTRITIS WITHOUT BLEEDING: ICD-10-CM

## 2025-07-16 DIAGNOSIS — R19.5 OTHER FECAL ABNORMALITIES: ICD-10-CM

## 2025-07-16 DIAGNOSIS — K44.9 DIAPHRAGMATIC HERNIA WITHOUT OBSTRUCTION OR GANGRENE: ICD-10-CM

## 2025-07-16 DIAGNOSIS — Z98.84 BARIATRIC SURGERY STATUS: ICD-10-CM

## 2025-07-16 DIAGNOSIS — Z12.11 ENCOUNTER FOR SCREENING FOR MALIGNANT NEOPLASM OF COLON: ICD-10-CM

## 2025-07-16 DIAGNOSIS — D12.0 BENIGN NEOPLASM OF CECUM: ICD-10-CM

## 2025-07-16 DIAGNOSIS — D12.3 BENIGN NEOPLASM OF TRANSVERSE COLON: ICD-10-CM

## 2025-07-21 ENCOUNTER — NON-APPOINTMENT (OUTPATIENT)
Age: 75
End: 2025-07-21

## 2025-09-15 ENCOUNTER — APPOINTMENT (OUTPATIENT)
Dept: ENDOCRINOLOGY | Facility: CLINIC | Age: 75
End: 2025-09-15